# Patient Record
Sex: MALE | Race: WHITE | NOT HISPANIC OR LATINO | Employment: OTHER | RURAL
[De-identification: names, ages, dates, MRNs, and addresses within clinical notes are randomized per-mention and may not be internally consistent; named-entity substitution may affect disease eponyms.]

---

## 2021-01-26 NOTE — PROGRESS NOTES
Chief Complaint  Establish Care    Subjective          Juan Cheng presents to [facility] [place] for     Pt is 66 yo male with management of being overweight  DM Type 2, HTN, HLP, history of genital herpes, CAD sp CABG x 4, history of colonic polyps     1/2621 Pt is here to establish.  He is from [place] and lived in [place] for many years. He is retired and worked for [employer].  He has history of CAD sp CABG X 4. In 2017.  .  His Cardiologist is Dr. Padgett in [place]. Do not have records and sees his Cardilogist about once a year.   Pt has a CMH of being overweight, HTN in which he takes toprol XL 25 mg PO q daily. For DM type 2 pt is on metformin 1000 mg PO q dialy along with jardiance 25 mg PO q daily.  He takes aspirin along with crestor for HLP. For Genital herpes pt is on Valtrex. Pt did see [facility] recently on 1/17/21 at . For low grade fever. He was tested for COVID that was negative. Along with flu test negative. He also has genital lesions that appeared about a few weeks ago. He got tested at [facility] and the test was not run properly. He states the lesions are painful and has been getting better with valtrex and has 8 pills left. He started treatment on 1/17/21. He has not been testes for herpes. He also has some irritation  Along shaft of penis. Also has various nodules and knots along with inguinal area. No pain on urination.  He has had more than 10 sexual partners  curretly  his  for 3 years. He has been  at least 4 times.  He is also having constipation issues since taking valtrex and his last cologuard test was last year which was negative. He has had 2 colonocopies in the past/        Male  Problem  The patient's pertinent negatives include no genital injury, genital lesions, pelvic pain, penile discharge, penile pain, priapism, scrotal swelling or testicular pain. This is a recurrent problem. The current episode started today. The  problem occurs constantly. The problem has been unchanged. The patient is experiencing no pain. Pertinent negatives include no abdominal pain, anorexia, chest pain, chills, constipation, coughing, diarrhea, discolored urine, dysuria, fever, flank pain, frequency, headaches, hematuria, hesitancy, joint pain, joint swelling, nausea, painful intercourse, rash, shortness of breath (), sore throat, urgency, urinary retention or vomiting. Associated symptoms comments: Genital lesion on penis . He has tried nothing for the symptoms. The treatment provided no relief. He is sexually active. No, his partner does not have an STD. There is no history of BPH, chlamydia, cryptorchidism, erectile aid use, erectile dysfunction, a femoral hernia, gonorrhea, herpes simplex, HIV, an inguinal hernia, kidney stones, prostatitis, sickle cell disease or varicocele.   Diabetes  He presents for his initial diabetic visit. He has type 2 diabetes mellitus. His disease course has been stable. Pertinent negatives for hypoglycemia include no confusion, dizziness, headaches, hunger, mood changes, nervousness/anxiousness, pallor, seizures, sleepiness, speech difficulty, sweats or tremors. Pertinent negatives for diabetes include no blurred vision, no chest pain, no fatigue, no foot paresthesias, no foot ulcerations, no polydipsia, no polyphagia, no polyuria, no visual change, no weakness and no weight loss. Pertinent negatives for hypoglycemia complications include no blackouts, no hospitalization, no nocturnal hypoglycemia, no required assistance and no required glucagon injection. Symptoms are stable. Pertinent negatives for diabetic complications include no autonomic neuropathy, CVA, heart disease, impotence, nephropathy, PVD or retinopathy. Risk factors for coronary artery disease include diabetes mellitus, dyslipidemia, hypertension and sedentary lifestyle. Current diabetic treatment includes oral agent (dual therapy). He is compliant with  treatment most of the time. His weight is stable. He is following a generally healthy diet. He has not had a previous visit with a dietitian. He participates in exercise intermittently. An ACE inhibitor/angiotensin II receptor blocker is not being taken. He does not see a podiatrist.Eye exam is not current.   Hypertension  This is a recurrent problem. The current episode started more than 1 month ago. The problem is unchanged. The problem is controlled. Pertinent negatives include no blurred vision, chest pain, headaches, shortness of breath () or sweats. Risk factors for coronary artery disease include diabetes mellitus, dyslipidemia, male gender and sedentary lifestyle. Past treatments include beta blockers. Current antihypertension treatment includes beta blockers. The current treatment provides significant improvement. There are no compliance problems.  There is no history of angina, kidney disease, CAD/MI, CVA, heart failure, left ventricular hypertrophy, PVD or retinopathy. There is no history of chronic renal disease, coarctation of the aorta, hyperaldosteronism, hypercortisolism, hyperparathyroidism, a hypertension causing med, pheochromocytoma, renovascular disease, sleep apnea or a thyroid problem.   Hyperlipidemia  This is a recurrent problem. The current episode started more than 1 month ago. The problem is controlled. Recent lipid tests were reviewed and are variable. He has no history of chronic renal disease, diabetes, hypothyroidism, liver disease, obesity or nephrotic syndrome. Pertinent negatives include no chest pain or shortness of breath (). Current antihyperlipidemic treatment includes statins. The current treatment provides moderate improvement of lipids. Risk factors for coronary artery disease include male sex, dyslipidemia and diabetes mellitus.   Constipation  This is a recurrent problem. The current episode started more than 1 month ago. The problem is unchanged. The patient is not on a  "high fiber diet. He does not exercise regularly. There has not been adequate water intake. Pertinent negatives include no abdominal pain, anorexia, back pain, bloating, diarrhea, difficulty urinating, fecal incontinence, fever, flatus, hematochezia, hemorrhoids, melena, nausea, vomiting or weight loss. The treatment provided mild relief. There is no history of abdominal surgery, endocrine disease, inflammatory bowel disease, irritable bowel syndrome, metabolic disease, neurologic disease, neuromuscular disease or radiation treatment.     Objective   Vital Signs:   /78 (BP Location: Left arm, Patient Position: Sitting, Cuff Size: Large Adult)   Pulse 94   Temp 96.6 °F (35.9 °C)   Ht 172.7 cm (68\")   Wt 86.3 kg (190 lb 3.2 oz)   SpO2 95%   BMI 28.92 kg/m²       Physical Exam  Vitals signs and nursing note reviewed.   Constitutional:       Appearance: He is well-developed. He is not diaphoretic.   HENT:      Head: Normocephalic and atraumatic.      Right Ear: External ear normal.   Eyes:      Conjunctiva/sclera: Conjunctivae normal.      Pupils: Pupils are equal, round, and reactive to light.   Neck:      Musculoskeletal: Normal range of motion and neck supple.   Cardiovascular:      Rate and Rhythm: Normal rate and regular rhythm.      Heart sounds: Normal heart sounds. No murmur.   Pulmonary:      Effort: Pulmonary effort is normal. No respiratory distress.      Breath sounds: Normal breath sounds.   Abdominal:      General: Bowel sounds are normal. There is no distension.      Palpations: Abdomen is soft.      Tenderness: There is no abdominal tenderness.   Genitourinary:      Musculoskeletal: Normal range of motion.         General: No deformity.   Skin:     General: Skin is warm.      Coloration: Skin is not pale.      Findings: No erythema or rash.   Neurological:      Mental Status: He is alert and oriented to person, place, and time.      Cranial Nerves: No cranial nerve deficit.   Psychiatric:    "      Behavior: Behavior normal.        Result Review :                 Assessment and Plan    Problem List Items Addressed This Visit        Cardiac and Vasculature    Coronary artery disease    Relevant Medications    metoprolol succinate XL (TOPROL-XL) 25 MG 24 hr tablet    Mixed hyperlipidemia    Relevant Medications    rosuvastatin (CRESTOR) 10 MG tablet    Other Relevant Orders    CBC Auto Differential    Comprehensive Metabolic Panel    Hemoglobin A1c    Lipid Panel    TSH    T4, Free    Vitamin D 25 Hydroxy    Vitamin B12    Microalbumin / Creatinine Urine Ratio - Urine, Clean Catch       Endocrine and Metabolic    Vitamin D deficiency, unspecified     Relevant Orders    Vitamin D 25 Hydroxy    Controlled type 2 diabetes mellitus with complication, without long-term current use of insulin (CMS/[facility])    Relevant Medications    metFORMIN (GLUCOPHAGE) 1000 MG tablet    Jardiance 25 MG tablet    Other Relevant Orders    CBC Auto Differential    Comprehensive Metabolic Panel    Hemoglobin A1c    Lipid Panel    TSH    T4, Free    Vitamin D 25 Hydroxy    Vitamin B12    Microalbumin / Creatinine Urine Ratio - Urine, Clean Catch    Overweight    Relevant Orders    CBC Auto Differential    Comprehensive Metabolic Panel    Hemoglobin A1c    Lipid Panel    TSH    T4, Free    Vitamin D 25 Hydroxy    Vitamin B12    Microalbumin / Creatinine Urine Ratio - Urine, Clean Catch       Infectious Diseases    Yeast infection    Relevant Medications    fluconazole (Diflucan) 150 MG tablet      Other Visit Diagnoses     Screening for STDs (sexually transmitted diseases)    -  Primary    Relevant Orders    HIV-1/O/2 ANTIGEN/ANTIBODY, 4TH GENERATION    HSV 1 and 2 IgM, Abs, Indirect    HSV 1 and 2-Specific Ab, IgG    RPR    Wound Culture - Wound, Penis    HIV-1 & HIV-2 Antibodies    Encounter for screening for endocrine disorder        Relevant Orders    CBC Auto Differential    Comprehensive Metabolic Panel    Hemoglobin A1c    Lipid  Panel    TSH    T4, Free    Vitamin D 25 Hydroxy    Vitamin B12    Microalbumin / Creatinine Urine Ratio - Urine, Clean Catch    HIV-1/O/2 ANTIGEN/ANTIBODY, 4TH GENERATION    HSV 1 and 2 IgM, Abs, Indirect    HSV 1 and 2-Specific Ab, IgG    RPR    Wound Culture - Wound, Penis    General medical examination        Relevant Orders    CBC Auto Differential    Comprehensive Metabolic Panel    Hemoglobin A1c    Lipid Panel    TSH    T4, Free    Vitamin D 25 Hydroxy    Vitamin B12    Microalbumin / Creatinine Urine Ratio - Urine, Clean Catch    Annual physical exam        Relevant Orders    CBC Auto Differential    Comprehensive Metabolic Panel    Hemoglobin A1c    Lipid Panel    TSH    T4, Free    Vitamin D 25 Hydroxy    Vitamin B12    Microalbumin / Creatinine Urine Ratio - Urine, Clean Catch    Need for hepatitis C screening test        Relevant Orders    CBC Auto Differential    Comprehensive Metabolic Panel    Hemoglobin A1c    Lipid Panel    TSH    T4, Free    Vitamin D 25 Hydroxy    Vitamin B12    Microalbumin / Creatinine Urine Ratio - Urine, Clean Catch    Hepatitis C Antibody    Dysuria        Relevant Orders    Urinalysis With Microscopic - Urine, Clean Catch    Urine Culture - Urine, Urine, Clean Catch    Genital lesion, male        Relevant Orders    HIV-1/O/2 ANTIGEN/ANTIBODY, 4TH GENERATION    HSV 1 and 2 IgM, Abs, Indirect    HSV 1 and 2-Specific Ab, IgG    RPR    Wound Culture - Wound, Penis    Encounter for screening for HIV        Relevant Orders    HIV-1/O/2 ANTIGEN/ANTIBODY, 4TH GENERATION    HSV 1 and 2 IgM, Abs, Indirect    HSV 1 and 2-Specific Ab, IgG    RPR    Wound Culture - Wound, Penis    S/P CABG x 4            -recommend labwork  -annual physical exam today  -recommend colonoscopy screening    -recommend shingles/tdap/pneumonia vaccination  -hep C screening -hep C antibody test  -constipation - try miralax 17 mg daily.  High fiber diet   -genital lesions/ulcer - wound culture today. STD  screening  Continue valtrex for genital herpes. Recheck in 2 weeks. Also will do STD screening  -CAD sp CABG  X 4 will get Cardiology records from   -HTN - on toprol XL 25 mg PO q daily.   -overweight - counseled weight loss >5 minutes BMI at 28.92  -DM type 2 on metformin 1000 mg PO q daily on jardiance 25 mg PO q daily  -history of genital herpes -on valtrex  -HLP - on crestor 10 mg pO qhs   -advised pt to be safe and call with questions and concerns  -advised pt to go to ER or call 911 if symptoms worrisome or severe  -advised pt to followup with specialist and referrals  -advised pt to be safe during COVID-19 pandemic  -total time with pt >25 minutes  -recheck in 2 weeks         This document has been electronically signed by Gage Means MD on January 27, 2021 11:13 CST          Follow Up   Return in about 1 month (around 2/27/2021).  Patient was given instructions and counseling regarding his condition or for health maintenance advice. Please see specific information pulled into the AVS if appropriate.

## 2021-01-27 ENCOUNTER — OFFICE VISIT (OUTPATIENT)
Dept: FAMILY MEDICINE CLINIC | Facility: CLINIC | Age: 66
End: 2021-01-27

## 2021-01-27 VITALS
HEART RATE: 94 BPM | HEIGHT: 68 IN | OXYGEN SATURATION: 95 % | WEIGHT: 190.2 LBS | DIASTOLIC BLOOD PRESSURE: 78 MMHG | SYSTOLIC BLOOD PRESSURE: 110 MMHG | BODY MASS INDEX: 28.82 KG/M2 | TEMPERATURE: 96.6 F

## 2021-01-27 DIAGNOSIS — Z11.3 SCREENING FOR STDS (SEXUALLY TRANSMITTED DISEASES): Primary | ICD-10-CM

## 2021-01-27 DIAGNOSIS — E11.8 CONTROLLED TYPE 2 DIABETES MELLITUS WITH COMPLICATION, WITHOUT LONG-TERM CURRENT USE OF INSULIN (HCC): ICD-10-CM

## 2021-01-27 DIAGNOSIS — N50.89 GENITAL LESION, MALE: ICD-10-CM

## 2021-01-27 DIAGNOSIS — I25.10 CORONARY ARTERY DISEASE, ANGINA PRESENCE UNSPECIFIED, UNSPECIFIED VESSEL OR LESION TYPE, UNSPECIFIED WHETHER NATIVE OR TRANSPLANTED HEART: ICD-10-CM

## 2021-01-27 DIAGNOSIS — E66.3 OVERWEIGHT: ICD-10-CM

## 2021-01-27 DIAGNOSIS — Z11.59 NEED FOR HEPATITIS C SCREENING TEST: ICD-10-CM

## 2021-01-27 DIAGNOSIS — B37.9 YEAST INFECTION: ICD-10-CM

## 2021-01-27 DIAGNOSIS — Z00.00 GENERAL MEDICAL EXAMINATION: ICD-10-CM

## 2021-01-27 DIAGNOSIS — Z11.4 ENCOUNTER FOR SCREENING FOR HIV: ICD-10-CM

## 2021-01-27 DIAGNOSIS — E78.2 MIXED HYPERLIPIDEMIA: ICD-10-CM

## 2021-01-27 DIAGNOSIS — Z00.00 ANNUAL PHYSICAL EXAM: ICD-10-CM

## 2021-01-27 DIAGNOSIS — R30.0 DYSURIA: ICD-10-CM

## 2021-01-27 DIAGNOSIS — E55.9 VITAMIN D DEFICIENCY, UNSPECIFIED: ICD-10-CM

## 2021-01-27 DIAGNOSIS — Z95.1 S/P CABG X 4: ICD-10-CM

## 2021-01-27 DIAGNOSIS — Z13.29 ENCOUNTER FOR SCREENING FOR ENDOCRINE DISORDER: ICD-10-CM

## 2021-01-27 PROCEDURE — 87147 CULTURE TYPE IMMUNOLOGIC: CPT | Performed by: FAMILY MEDICINE

## 2021-01-27 PROCEDURE — 99204 OFFICE O/P NEW MOD 45 MIN: CPT | Performed by: FAMILY MEDICINE

## 2021-01-27 PROCEDURE — 87205 SMEAR GRAM STAIN: CPT | Performed by: FAMILY MEDICINE

## 2021-01-27 PROCEDURE — 87070 CULTURE OTHR SPECIMN AEROBIC: CPT | Performed by: FAMILY MEDICINE

## 2021-01-27 RX ORDER — FLUCONAZOLE 150 MG/1
150 TABLET ORAL ONCE
Qty: 1 TABLET | Refills: 0 | Status: SHIPPED | OUTPATIENT
Start: 2021-01-27 | End: 2021-01-28 | Stop reason: SDUPTHER

## 2021-01-27 RX ORDER — EMPAGLIFLOZIN 25 MG/1
TABLET, FILM COATED ORAL
COMMUNITY
Start: 2021-01-01 | End: 2021-01-27 | Stop reason: SDUPTHER

## 2021-01-27 RX ORDER — EMPAGLIFLOZIN 25 MG/1
25 TABLET, FILM COATED ORAL DAILY
Qty: 90 TABLET | Refills: 3 | Status: SHIPPED | OUTPATIENT
Start: 2021-01-27 | End: 2021-03-16

## 2021-01-27 RX ORDER — METOPROLOL SUCCINATE 25 MG/1
25 TABLET, EXTENDED RELEASE ORAL DAILY
COMMUNITY
Start: 2020-12-05 | End: 2021-09-09 | Stop reason: SDUPTHER

## 2021-01-27 RX ORDER — CHLORAL HYDRATE 500 MG
CAPSULE ORAL
COMMUNITY
End: 2021-04-01

## 2021-01-27 RX ORDER — POLYETHYLENE GLYCOL 3350 17 G/17G
17 POWDER, FOR SOLUTION ORAL DAILY
Qty: 30 PACKET | Refills: 3 | Status: SHIPPED | OUTPATIENT
Start: 2021-01-27 | End: 2021-03-05

## 2021-01-27 RX ORDER — ASPIRIN 81 MG/1
81 TABLET ORAL DAILY
COMMUNITY

## 2021-01-27 RX ORDER — VALACYCLOVIR HYDROCHLORIDE 1 G/1
TABLET, FILM COATED ORAL
COMMUNITY
Start: 2021-01-21 | End: 2021-03-05

## 2021-01-27 RX ORDER — ROSUVASTATIN CALCIUM 10 MG/1
10 TABLET, COATED ORAL NIGHTLY
COMMUNITY
Start: 2021-01-01 | End: 2021-10-06 | Stop reason: SDUPTHER

## 2021-01-27 NOTE — PATIENT INSTRUCTIONS
Polyethylene Glycol powder  What is this medicine?  POLYETHYLENE GLYCOL 3350 (tiffany ee ETH i ciro; GLYE col) powder is a laxative used to treat constipation. It increases the amount of water in the stool. Bowel movements become easier and more frequent.  This medicine may be used for other purposes; ask your health care provider or pharmacist if you have questions.  COMMON BRAND NAME(S): GaviLax, GIALAX, GlycoLax, Healthylax, MiraLax, Smooth LAX, [employer] Health  What should I tell my health care provider before I take this medicine?  They need to know if you have any of these conditions:  · a history of blockage of the stomach or intestine  · current abdomen distension or pain  · difficulty swallowing  · diverticulitis, ulcerative colitis, or other chronic bowel disease  · phenylketonuria  · an unusual or allergic reaction to polyethylene glycol, other medicines, dyes, or preservatives  · pregnant or trying to get pregnant  · breast-feeding  How should I use this medicine?  Take this medicine by mouth. The bottle has a measuring cap that is marked with a line. Pour the powder into the cap up to the marked line (the dose is about 1 heaping tablespoon). Add the powder in the cap to a full glass (4 to 8 ounces or 120 to 240 mL) of water, juice, soda, coffee or tea. Mix the powder well. Ensure that the powder is fully dissolved. Do not drink if there are any clumps. Drink the solution. Take exactly as directed. Do not take your medicine more often than directed.  Talk to your pediatrician regarding the use of this medicine in children. Special care may be needed.  Overdosage: If you think you have taken too much of this medicine contact a poison control center or emergency room at once.  NOTE: This medicine is only for you. Do not share this medicine with others.  What if I miss a dose?  If you miss a dose, take it as soon as you can. If it is almost time for your next dose, take only that dose. Do not take double or extra  doses.  What may interact with this medicine?  Interactions are not expected.  This list may not describe all possible interactions. Give your health care provider a list of all the medicines, herbs, non-prescription drugs, or dietary supplements you use. Also tell them if you smoke, drink alcohol, or use illegal drugs. Some items may interact with your medicine.  What should I watch for while using this medicine?  Do not use for more than 2 weeks without advice from your doctor or health care professional. It can take 2 to 4 days to have a bowel movement and to experience improvement in constipation. See your health care professional for any changes in bowel habits, including constipation, that are severe or last longer than three weeks.  Always take this medicine with plenty of water.  What side effects may I notice from receiving this medicine?  Side effects that you should report to your doctor or health care professional as soon as possible:  · diarrhea  · difficulty breathing  · itching of the skin, hives, or skin rash  · severe bloating, pain, or distension of the stomach  · vomiting  Side effects that usually do not require medical attention (report to your doctor or health care professional if they continue or are bothersome):  · bloating or gas  · lower abdominal discomfort or cramps  · nausea  This list may not describe all possible side effects. Call your doctor for medical advice about side effects. You may report side effects to FDA at 9-364-EFT-1773.  Where should I keep my medicine?  Keep out of the reach of children.  Store between 15 and 30 degrees C (59 and 86 degrees F). Throw away any unused medicine after the expiration date.  NOTE: This sheet is a summary. It may not cover all possible information. If you have questions about this medicine, talk to your doctor, pharmacist, or health care provider.  © 2020 Elsevier/Gold Standard (2019-06-06 10:42:01)    High-Fiber Diet  Fiber, also called  dietary fiber, is a type of carbohydrate that is found in fruits, vegetables, whole grains, and beans. A high-fiber diet can have many health benefits. Your health care provider may recommend a high-fiber diet to help:  · Prevent constipation. Fiber can make your bowel movements more regular.  · Lower your cholesterol.  · Relieve the following conditions:  ? Swelling of veins in the anus (hemorrhoids).  ? Swelling and irritation (inflammation) of specific areas of the digestive tract (uncomplicated diverticulosis).  ? A problem of the large intestine (colon) that sometimes causes pain and diarrhea (irritable bowel syndrome, IBS).  · Prevent overeating as part of a weight-loss plan.  · Prevent heart disease, type 2 diabetes, and certain cancers.  What is my plan?  The recommended daily fiber intake in grams (g) includes:  · 38 g for men age 50 or younger.  · 30 g for men over age 50.  · 25 g for women age 50 or younger.  · 21 g for women over age 50.  You can get the recommended daily intake of dietary fiber by:  · Eating a variety of fruits, vegetables, grains, and beans.  · Taking a fiber supplement, if it is not possible to get enough fiber through your diet.  What do I need to know about a high-fiber diet?  · It is better to get fiber through food sources rather than from fiber supplements. There is not a lot of research about how effective supplements are.  · Always check the fiber content on the nutrition facts label of any prepackaged food. Look for foods that contain 5 g of fiber or more per serving.  · Talk with a diet and nutrition specialist (dietitian) if you have questions about specific foods that are recommended or not recommended for your medical condition, especially if those foods are not listed below.  · Gradually increase how much fiber you consume. If you increase your intake of dietary fiber too quickly, you may have bloating, cramping, or gas.  · Drink plenty of water. Water helps you to digest  fiber.  What are tips for following this plan?  · Eat a wide variety of high-fiber foods.  · Make sure that half of the grains that you eat each day are whole grains.  · Eat breads and cereals that are made with whole-grain flour instead of refined flour or white flour.  · Eat brown rice, bulgur wheat, or millet instead of white rice.  · Start the day with a breakfast that is high in fiber, such as a cereal that contains 5 g of fiber or more per serving.  · Use beans in place of meat in soups, salads, and pasta dishes.  · Eat high-fiber snacks, such as berries, raw vegetables, nuts, and popcorn.  · Choose whole fruits and vegetables instead of processed forms like juice or sauce.  What foods can I eat?    Fruits  Berries. Pears. Apples. Oranges. Avocado. Prunes and raisins. Dried figs.  Vegetables  Sweet potatoes. Spinach. Kale. Artichokes. Cabbage. Broccoli. Cauliflower. Green peas. Carrots. Squash.  Grains  Whole-grain breads. Multigrain cereal. Oats and oatmeal. Brown rice. Barley. Bulgur wheat. Millet. Quinoa. Bran muffins. Popcorn. Rye wafer crackers.  Meats and other proteins  Navy, kidney, and gunter beans. Soybeans. Split peas. Lentils. Nuts and seeds.  Dairy  Fiber-fortified yogurt.  Beverages  Fiber-fortified soy milk. Fiber-fortified orange juice.  Other foods  Fiber bars.  The items listed above may not be a complete list of recommended foods and beverages. Contact a dietitian for more options.  What foods are not recommended?  Fruits  Fruit juice. Cooked, strained fruit.  Vegetables  Fried potatoes. Canned vegetables. Well-cooked vegetables.  Grains  White bread. Pasta made with refined flour. White rice.  Meats and other proteins  Fatty cuts of meat. Fried chicken or fried fish.  Dairy  Milk. Yogurt. Cream cheese. Sour cream.  Fats and oils  [place].  Beverages  Soft drinks.  Other foods  Cakes and pastries.  The items listed above may not be a complete list of foods and beverages to avoid. Contact a  dietitian for more information.  Summary  · Fiber is a type of carbohydrate. It is found in fruits, vegetables, whole grains, and beans.  · There are many health benefits of eating a high-fiber diet, such as preventing constipation, lowering blood cholesterol, helping with weight loss, and reducing your risk of heart disease, diabetes, and certain cancers.  · Gradually increase your intake of fiber. Increasing too fast can result in cramping, bloating, and gas. Drink plenty of water while you increase your fiber.  · The best sources of fiber include whole fruits and vegetables, whole grains, nuts, seeds, and beans.  This information is not intended to replace advice given to you by your health care provider. Make sure you discuss any questions you have with your health care provider.  Document Revised: 10/22/2018 Document Reviewed: 10/22/2018  Elsevier Patient Education © 2020 Elsevier Inc.

## 2021-01-28 ENCOUNTER — TELEPHONE (OUTPATIENT)
Dept: FAMILY MEDICINE CLINIC | Facility: CLINIC | Age: 66
End: 2021-01-28

## 2021-01-28 ENCOUNTER — LAB (OUTPATIENT)
Dept: LAB | Facility: HOSPITAL | Age: 66
End: 2021-01-28

## 2021-01-28 DIAGNOSIS — N50.89 GENITAL LESION, MALE: ICD-10-CM

## 2021-01-28 DIAGNOSIS — E66.3 OVERWEIGHT: ICD-10-CM

## 2021-01-28 DIAGNOSIS — Z11.59 NEED FOR HEPATITIS C SCREENING TEST: ICD-10-CM

## 2021-01-28 DIAGNOSIS — R30.0 DYSURIA: ICD-10-CM

## 2021-01-28 DIAGNOSIS — Z00.00 GENERAL MEDICAL EXAMINATION: ICD-10-CM

## 2021-01-28 DIAGNOSIS — E55.9 VITAMIN D DEFICIENCY, UNSPECIFIED: ICD-10-CM

## 2021-01-28 DIAGNOSIS — Z00.00 ANNUAL PHYSICAL EXAM: ICD-10-CM

## 2021-01-28 DIAGNOSIS — E78.2 MIXED HYPERLIPIDEMIA: ICD-10-CM

## 2021-01-28 DIAGNOSIS — Z13.29 ENCOUNTER FOR SCREENING FOR ENDOCRINE DISORDER: ICD-10-CM

## 2021-01-28 DIAGNOSIS — Z11.3 SCREENING FOR STDS (SEXUALLY TRANSMITTED DISEASES): ICD-10-CM

## 2021-01-28 DIAGNOSIS — E11.8 CONTROLLED TYPE 2 DIABETES MELLITUS WITH COMPLICATION, WITHOUT LONG-TERM CURRENT USE OF INSULIN (HCC): ICD-10-CM

## 2021-01-28 DIAGNOSIS — Z11.4 ENCOUNTER FOR SCREENING FOR HIV: ICD-10-CM

## 2021-01-28 LAB
25(OH)D3 SERPL-MCNC: 22.5 NG/ML (ref 30–100)
ALBUMIN SERPL-MCNC: 4.6 G/DL (ref 3.5–5.2)
ALBUMIN UR-MCNC: <1.2 MG/DL
ALBUMIN/GLOB SERPL: 1.4 G/DL
ALP SERPL-CCNC: 79 U/L (ref 39–117)
ALT SERPL W P-5'-P-CCNC: 51 U/L (ref 1–41)
ANION GAP SERPL CALCULATED.3IONS-SCNC: 9.3 MMOL/L (ref 5–15)
AST SERPL-CCNC: 27 U/L (ref 1–40)
BACTERIA UR QL AUTO: NORMAL /HPF
BASOPHILS # BLD AUTO: 0.12 10*3/MM3 (ref 0–0.2)
BASOPHILS NFR BLD AUTO: 1.2 % (ref 0–1.5)
BILIRUB SERPL-MCNC: 0.4 MG/DL (ref 0–1.2)
BILIRUB UR QL STRIP: NEGATIVE
BUN SERPL-MCNC: 23 MG/DL (ref 8–23)
BUN/CREAT SERPL: 29.1 (ref 7–25)
CALCIUM SPEC-SCNC: 10.2 MG/DL (ref 8.6–10.5)
CHLORIDE SERPL-SCNC: 100 MMOL/L (ref 98–107)
CHOLEST SERPL-MCNC: 106 MG/DL (ref 0–200)
CLARITY UR: CLEAR
CO2 SERPL-SCNC: 27.7 MMOL/L (ref 22–29)
COLOR UR: YELLOW
CREAT SERPL-MCNC: 0.79 MG/DL (ref 0.76–1.27)
CREAT UR-MCNC: 71.6 MG/DL
DEPRECATED RDW RBC AUTO: 40 FL (ref 37–54)
EOSINOPHIL # BLD AUTO: 0.19 10*3/MM3 (ref 0–0.4)
EOSINOPHIL NFR BLD AUTO: 1.9 % (ref 0.3–6.2)
ERYTHROCYTE [DISTWIDTH] IN BLOOD BY AUTOMATED COUNT: 12.9 % (ref 12.3–15.4)
GFR SERPL CREATININE-BSD FRML MDRD: 98 ML/MIN/1.73
GLOBULIN UR ELPH-MCNC: 3.4 GM/DL
GLUCOSE SERPL-MCNC: 119 MG/DL (ref 65–99)
GLUCOSE UR STRIP-MCNC: ABNORMAL MG/DL
HBA1C MFR BLD: 6.6 % (ref 4.8–5.6)
HCT VFR BLD AUTO: 47.7 % (ref 37.5–51)
HCV AB SER DONR QL: NORMAL
HDLC SERPL-MCNC: 35 MG/DL (ref 40–60)
HGB BLD-MCNC: 16.4 G/DL (ref 13–17.7)
HGB UR QL STRIP.AUTO: NEGATIVE
HIV1+2 AB SER QL: NORMAL
HYALINE CASTS UR QL AUTO: NORMAL /LPF
IMM GRANULOCYTES # BLD AUTO: 0.04 10*3/MM3 (ref 0–0.05)
IMM GRANULOCYTES NFR BLD AUTO: 0.4 % (ref 0–0.5)
KETONES UR QL STRIP: ABNORMAL
LDLC SERPL CALC-MCNC: 40 MG/DL (ref 0–100)
LDLC/HDLC SERPL: 0.92 {RATIO}
LEUKOCYTE ESTERASE UR QL STRIP.AUTO: NEGATIVE
LYMPHOCYTES # BLD AUTO: 4.71 10*3/MM3 (ref 0.7–3.1)
LYMPHOCYTES NFR BLD AUTO: 46.1 % (ref 19.6–45.3)
MCH RBC QN AUTO: 30.2 PG (ref 26.6–33)
MCHC RBC AUTO-ENTMCNC: 34.4 G/DL (ref 31.5–35.7)
MCV RBC AUTO: 87.8 FL (ref 79–97)
MICROALBUMIN/CREAT UR: NORMAL MG/G{CREAT}
MONOCYTES # BLD AUTO: 0.6 10*3/MM3 (ref 0.1–0.9)
MONOCYTES NFR BLD AUTO: 5.9 % (ref 5–12)
NEUTROPHILS NFR BLD AUTO: 4.55 10*3/MM3 (ref 1.7–7)
NEUTROPHILS NFR BLD AUTO: 44.5 % (ref 42.7–76)
NITRITE UR QL STRIP: NEGATIVE
NRBC BLD AUTO-RTO: 0 /100 WBC (ref 0–0.2)
PH UR STRIP.AUTO: 6 [PH] (ref 5–8)
PLATELET # BLD AUTO: 276 10*3/MM3 (ref 140–450)
PMV BLD AUTO: 10.1 FL (ref 6–12)
POTASSIUM SERPL-SCNC: 4.6 MMOL/L (ref 3.5–5.2)
PROT SERPL-MCNC: 8 G/DL (ref 6–8.5)
PROT UR QL STRIP: NEGATIVE
RBC # BLD AUTO: 5.43 10*6/MM3 (ref 4.14–5.8)
RBC # UR: NORMAL /HPF
REF LAB TEST METHOD: NORMAL
RPR SER QL: NORMAL
SODIUM SERPL-SCNC: 137 MMOL/L (ref 136–145)
SP GR UR STRIP: >=1.03 (ref 1–1.03)
SQUAMOUS #/AREA URNS HPF: NORMAL /HPF
T4 FREE SERPL-MCNC: 1.35 NG/DL (ref 0.93–1.7)
TRIGL SERPL-MCNC: 194 MG/DL (ref 0–150)
TSH SERPL DL<=0.05 MIU/L-ACNC: 1.2 UIU/ML (ref 0.27–4.2)
UROBILINOGEN UR QL STRIP: ABNORMAL
VIT B12 BLD-MCNC: 281 PG/ML (ref 211–946)
VLDLC SERPL-MCNC: 31 MG/DL (ref 5–40)
WBC # BLD AUTO: 10.21 10*3/MM3 (ref 3.4–10.8)
WBC UR QL AUTO: NORMAL /HPF

## 2021-01-28 PROCEDURE — 86592 SYPHILIS TEST NON-TREP QUAL: CPT

## 2021-01-28 PROCEDURE — 87086 URINE CULTURE/COLONY COUNT: CPT

## 2021-01-28 PROCEDURE — 86696 HERPES SIMPLEX TYPE 2 TEST: CPT

## 2021-01-28 PROCEDURE — G0432 EIA HIV-1/HIV-2 SCREEN: HCPCS

## 2021-01-28 PROCEDURE — 81001 URINALYSIS AUTO W/SCOPE: CPT

## 2021-01-28 PROCEDURE — 84443 ASSAY THYROID STIM HORMONE: CPT

## 2021-01-28 PROCEDURE — 86803 HEPATITIS C AB TEST: CPT

## 2021-01-28 PROCEDURE — 82043 UR ALBUMIN QUANTITATIVE: CPT

## 2021-01-28 PROCEDURE — 82570 ASSAY OF URINE CREATININE: CPT

## 2021-01-28 PROCEDURE — 86695 HERPES SIMPLEX TYPE 1 TEST: CPT

## 2021-01-28 PROCEDURE — 84439 ASSAY OF FREE THYROXINE: CPT

## 2021-01-28 PROCEDURE — 82306 VITAMIN D 25 HYDROXY: CPT

## 2021-01-28 PROCEDURE — 82607 VITAMIN B-12: CPT

## 2021-01-28 PROCEDURE — 80061 LIPID PANEL: CPT

## 2021-01-28 PROCEDURE — 83036 HEMOGLOBIN GLYCOSYLATED A1C: CPT

## 2021-01-28 PROCEDURE — 85025 COMPLETE CBC W/AUTO DIFF WBC: CPT

## 2021-01-28 PROCEDURE — 80053 COMPREHEN METABOLIC PANEL: CPT

## 2021-01-28 RX ORDER — SULFAMETHOXAZOLE AND TRIMETHOPRIM 800; 160 MG/1; MG/1
1 TABLET ORAL 2 TIMES DAILY
Qty: 20 TABLET | Refills: 0 | Status: SHIPPED | OUTPATIENT
Start: 2021-01-28 | End: 2021-02-07

## 2021-01-28 RX ORDER — FLUCONAZOLE 150 MG/1
TABLET ORAL
Qty: 2 TABLET | Refills: 0 | Status: SHIPPED | OUTPATIENT
Start: 2021-01-28 | End: 2021-03-05

## 2021-01-28 NOTE — TELEPHONE ENCOUNTER
DELETE AFTER REVIEWING: Telephone encounter to be sent to the clinical pool.    Pharmacy Name:  [facility]    Pharmacy representative name: BRAYAN    Pharmacy representative phone number: [phone]    What medication are you calling in regards to: SLUCONAZOLE    What question does the pharmacy have: INCORRECT QUANTITY  Who is the provider that prescribed the medication:

## 2021-01-28 NOTE — TELEPHONE ENCOUNTER
PATIENT WIFE CALLED SHE WOULD LIKE SOMEONE TO CALL HER TO DISCUSS HER HUSBANDS LAB RESULTS THAT WERE DONE THIS MORNING AND TO SEE WHAT AND WHY HE WAS PRESCRIBED.    PLEASE CALL AND ADVISE  [phone]

## 2021-01-28 NOTE — TELEPHONE ENCOUNTER
Made aware that we did not send this medication in and she voiced understanding. She stated [facility] probably sent it from when he went to urgent care.

## 2021-01-28 NOTE — TELEPHONE ENCOUNTER
----- Message from Gage Means MD sent at 1/27/2021  8:33 PM CST -----  Please call pt    Pt's  Wound shows gram positive cocci in pairs. May be staph infection or MRSA    Recommend pt start on Bactrim -160 mg every 12 hours for 10 days. Give 20 pills and no refills.      Continue with valtrex and diflucan    Awaiting rest of wound culture    Recheck on next visit. Thanks

## 2021-01-28 NOTE — TELEPHONE ENCOUNTER
PT'S WIFE CALLED REGARDING PT'S MEDICATIONS; PT WAS ADVISED BY PHARMACIST TO CLARIFY WITH DR. AVINA WHETHER HE SHOULD BE TAKING BOTH VALTREX AND THE NEW PRESCRIPTION OF VALACYLOVIR CALLED IN TODAY SINCE THEY ARE ESSENTIALLY THE SAME MEDICATION    PT STILL HAS 6-7 DOSES OF VALTREX LEFT AND WANTS TO KNOW IF HE SHOULD STOP TAKING IT    CALLBACK [phone]

## 2021-01-29 LAB
BACTERIA SPEC AEROBE CULT: ABNORMAL
BACTERIA SPEC AEROBE CULT: ABNORMAL
BACTERIA SPEC AEROBE CULT: NO GROWTH
GRAM STN SPEC: ABNORMAL
GRAM STN SPEC: ABNORMAL
HSV1 IGG SER IA-ACNC: 1.57 INDEX (ref 0–0.9)
HSV2 IGG SER IA-ACNC: 2.03 INDEX (ref 0–0.9)
HSV2 IGG SERPL QL IA: POSITIVE

## 2021-02-01 ENCOUNTER — TELEPHONE (OUTPATIENT)
Dept: FAMILY MEDICINE CLINIC | Facility: CLINIC | Age: 66
End: 2021-02-01

## 2021-02-01 DIAGNOSIS — R74.8 ELEVATED LIVER ENZYMES: Primary | ICD-10-CM

## 2021-02-01 LAB
HSV1 IGM TITR SER IF: ABNORMAL TITER
HSV2 IGM TITR SER IF: ABNORMAL TITER

## 2021-02-01 RX ORDER — ERGOCALCIFEROL 1.25 MG/1
50000 CAPSULE ORAL
Qty: 4 CAPSULE | Refills: 3 | Status: SHIPPED | OUTPATIENT
Start: 2021-02-01

## 2021-02-01 NOTE — TELEPHONE ENCOUNTER
----- Message from Gage Means MD sent at 1/30/2021  7:03 PM CST -----  Let pt know wound shows group B strep and continue with abx until finished    Recheck on next visit Thanks

## 2021-02-01 NOTE — TELEPHONE ENCOUNTER
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 Let me know if pt agreeable and I will order    Recheck on next visit. Thanks

## 2021-02-01 NOTE — TELEPHONE ENCOUNTER
Gave pt results and recommendations. Pt voiced understanding and is agreeable to US and Vitamin D.

## 2021-02-12 ENCOUNTER — TELEPHONE (OUTPATIENT)
Dept: FAMILY MEDICINE CLINIC | Facility: CLINIC | Age: 66
End: 2021-02-12

## 2021-02-12 NOTE — TELEPHONE ENCOUNTER
----- Message from Gage Means MD sent at 2/12/2021  2:33 PM CST -----  Regarding: US of liver  Please let pt know that US of liver  on 2/10/21 shows areas that suggest mild fatty liver. Will need to discuss on next visit. Thanks

## 2021-02-24 DIAGNOSIS — R74.8 ELEVATED LIVER ENZYMES: ICD-10-CM

## 2021-03-05 ENCOUNTER — OFFICE VISIT (OUTPATIENT)
Dept: FAMILY MEDICINE CLINIC | Facility: CLINIC | Age: 66
End: 2021-03-05

## 2021-03-05 VITALS
HEART RATE: 88 BPM | WEIGHT: 193 LBS | DIASTOLIC BLOOD PRESSURE: 68 MMHG | BODY MASS INDEX: 29.35 KG/M2 | OXYGEN SATURATION: 96 % | SYSTOLIC BLOOD PRESSURE: 110 MMHG | TEMPERATURE: 96.4 F

## 2021-03-05 DIAGNOSIS — E66.3 OVERWEIGHT: ICD-10-CM

## 2021-03-05 DIAGNOSIS — E55.9 VITAMIN D DEFICIENCY, UNSPECIFIED: ICD-10-CM

## 2021-03-05 DIAGNOSIS — I25.10 CORONARY ARTERY DISEASE, ANGINA PRESENCE UNSPECIFIED, UNSPECIFIED VESSEL OR LESION TYPE, UNSPECIFIED WHETHER NATIVE OR TRANSPLANTED HEART: ICD-10-CM

## 2021-03-05 DIAGNOSIS — Z23 NEED FOR VACCINATION: ICD-10-CM

## 2021-03-05 DIAGNOSIS — Z12.11 ENCOUNTER FOR SCREENING COLONOSCOPY: ICD-10-CM

## 2021-03-05 DIAGNOSIS — E78.2 MIXED HYPERLIPIDEMIA: ICD-10-CM

## 2021-03-05 DIAGNOSIS — E11.8 CONTROLLED TYPE 2 DIABETES MELLITUS WITH COMPLICATION, WITHOUT LONG-TERM CURRENT USE OF INSULIN (HCC): Primary | ICD-10-CM

## 2021-03-05 DIAGNOSIS — B00.9 HERPES INFECTION: ICD-10-CM

## 2021-03-05 PROCEDURE — 90471 IMMUNIZATION ADMIN: CPT | Performed by: FAMILY MEDICINE

## 2021-03-05 PROCEDURE — 90715 TDAP VACCINE 7 YRS/> IM: CPT | Performed by: FAMILY MEDICINE

## 2021-03-05 PROCEDURE — 90732 PPSV23 VACC 2 YRS+ SUBQ/IM: CPT | Performed by: FAMILY MEDICINE

## 2021-03-05 PROCEDURE — 99214 OFFICE O/P EST MOD 30 MIN: CPT | Performed by: FAMILY MEDICINE

## 2021-03-05 PROCEDURE — G0009 ADMIN PNEUMOCOCCAL VACCINE: HCPCS | Performed by: FAMILY MEDICINE

## 2021-03-05 RX ORDER — CHOLECALCIFEROL (VITAMIN D3) 125 MCG
500 CAPSULE ORAL DAILY
Qty: 30 TABLET | Refills: 3 | Status: SHIPPED | OUTPATIENT
Start: 2021-03-05 | End: 2021-10-14 | Stop reason: SDUPTHER

## 2021-03-05 RX ORDER — ICOSAPENT ETHYL 1000 MG/1
2 CAPSULE ORAL 2 TIMES DAILY WITH MEALS
Qty: 120 CAPSULE | Refills: 3 | Status: SHIPPED | OUTPATIENT
Start: 2021-03-05 | End: 2021-05-11

## 2021-03-05 NOTE — PATIENT INSTRUCTIONS
Icosapent ethyl capsules  What is this medicine?  ICOSAPENT ETHYL (eye KOE sa [facility]) contains essential fats. It is used to treat high triglyceride levels. Diet and lifestyle changes are often used with this drug.  This medicine may be used for other purposes; ask your health care provider or pharmacist if you have questions.  COMMON BRAND NAME(S): VASCEPA  What should I tell my health care provider before I take this medicine?  They need to know if you have any of these conditions:  · bleeding disorders  · liver disease  · an unusual or allergic reaction to icosapent ethyl, fish, shellfish, other medicines, foods, dyes, or preservatives  · history of irregular heartbeat  · pregnant or trying to get pregnant  · breast-feeding  How should I use this medicine?  Take this medicine by mouth with a glass of water. Follow the directions on the prescription label. Take this medicine with food. Do not cut, crush, dissolve, or chew this medicine. Take your medicine at regular intervals. Do not take it more often than directed. Do not stop taking except on your doctor's advice.  Talk to your pediatrician regarding the use of this medicine in children. Special care may be needed.  Overdosage: If you think you have taken too much of this medicine contact a poison control center or emergency room at once.  NOTE: This medicine is only for you. Do not share this medicine with others.  What if I miss a dose?  If you miss a dose, take it as soon as you can. If it is almost time for your next dose, take only that dose. Do not take double or extra doses.  What may interact with this medicine?  This medicine may interact with the following medications:  · aspirin and aspirin-like medicines  · beta-blockers like metoprolol and propranolol  · certain medicines that treat or prevent blood clots like warfarin, enoxaparin, dalteparin, apixaban, dabigatran, and rivaroxaban  · diuretics  · female hormones, like estrogens and birth  control pills  This list may not describe all possible interactions. Give your health care provider a list of all the medicines, herbs, non-prescription drugs, or dietary supplements you use. Also tell them if you smoke, drink alcohol, or use illegal drugs. Some items may interact with your medicine.  What should I watch for while using this medicine?  You may need blood work done while you are taking this medicine.  Follow a good diet and exercise plan. Taking this medicine does not replace a healthy lifestyle. Some foods that have omega-3 fatty acids naturally are fatty fish like albacore tuna, halibut, herring, mackerel, lake trout, salmon, and sardines.  If you are scheduled for any medical or dental procedure, tell your healthcare provider that you are taking this medicine. You may need to stop taking this medicine before the procedure.  What side effects may I notice from receiving this medicine?  Side effects that you should report to your doctor or health care professional as soon as possible:  · allergic reactions like skin rash, itching or hives, swelling of the face, lips, or tongue  · breathing problems  · unusual bleeding or bruising  · fast, irregular heartbeat  Side effects that usually do not require medical attention (report to your doctor or health care professional if they continue or are bothersome):  · muscle or joint pain  · sore throat  · swelling of the ankles, feet, hands  · constipation  · Gout  This list may not describe all possible side effects. Call your doctor for medical advice about side effects. You may report side effects to FDA at 4-150-FDA-5873.  Where should I keep my medicine?  Keep out of the reach of children.  Store at room temperature between 15 and 30 degrees C (59 and 86 degrees F). Throw away any unused medicine after the expiration date.  NOTE: This sheet is a summary. It may not cover all possible information. If you have questions about this medicine, talk to your  "doctor, pharmacist, or health care provider.  © 2021 Elsevier/Gold Standard (2019-12-17 18:35:46)  Td (Tetanus, Diphtheria) Vaccine: What You Need to Know  1. Why get vaccinated?  Td vaccine can prevent tetanus and diphtheria.  Tetanus enters the body through cuts or wounds. Diphtheria spreads from person to person.  · TETANUS (T) causes painful stiffening of the muscles. Tetanus can lead to serious health problems, including being unable to open the mouth, having trouble swallowing and breathing, or death.  · DIPHTHERIA (D) can lead to difficulty breathing, heart failure, paralysis, or death.  2. Td vaccine  Td is only for children 7 years and older, adolescents, and adults.   Td is usually given as a booster dose every 10 years, but it can also be given earlier after a severe and dirty wound or burn.  Another vaccine, called Tdap, that protects against pertussis, also known as \"whooping cough,\" in addition to tetanus and diphtheria, may be used instead of Td.   Td may be given at the same time as other vaccines.  3. Talk with your health care provider  Tell your vaccine provider if the person getting the vaccine:  · Has had an allergic reaction after a previous dose of any vaccine that protects against tetanus or diphtheria, or has any severe, life-threatening allergies.  · Has ever had Guillain-Barré Syndrome (also called GBS).  · Has had severe pain or swelling after a previous dose of any vaccine that protects against tetanus or diphtheria.  In some cases, your health care provider may decide to postpone Td vaccination to a future visit.   People with minor illnesses, such as a cold, may be vaccinated. People who are moderately or severely ill should usually wait until they recover before getting Td vaccine.   Your health care provider can give you more information.  4. Risks of a vaccine reaction  · Pain, redness, or swelling where the shot was given, mild fever, headache, feeling tired, and nausea, vomiting, " diarrhea, or stomachache sometimes happen after Td vaccine.  People sometimes faint after medical procedures, including vaccination. Tell your provider if you feel dizzy or have vision changes or ringing in the ears.   As with any medicine, there is a very remote chance of a vaccine causing a severe allergic reaction, other serious injury, or death.  5. What if there is a serious problem?  An allergic reaction could occur after the vaccinated person leaves the clinic. If you see signs of a severe allergic reaction (hives, swelling of the face and throat, difficulty breathing, a fast heartbeat, dizziness, or weakness), call 9-1-1 and get the person to the nearest hospital.   For other signs that concern you, call your health care provider.   Adverse reactions should be reported to the Vaccine Adverse Event Reporting System (VAERS). Your health care provider will usually file this report, or you can do it yourself. Visit the VAERS website at www.vaers.hhs.gov or call 1-522.822.7025. VAERS is only for reporting reactions, and VAERS staff do not give medical advice.  6. The National Vaccine Injury Compensation Program  The National Vaccine Injury Compensation Program (VICP) is a federal program that was created to compensate people who may have been injured by certain vaccines. Visit the VICP website at www.hrsa.gov/vaccinecompensation or call 1-914.883.8915 to learn about the program and about filing a claim. There is a time limit to file a claim for compensation.  7. How can I learn more?  · Ask your health care provider.  · Call your local or state health department.  · Contact the Centers for Disease Control and Prevention (CDC):  ? Call 1-388.455.1332 (6-073-TZR-INFO) or  ? Visit CDC's website at www.cdc.gov/vaccines  Vaccine Information Statement Td Vaccine (04/01/20)  This information is not intended to replace advice given to you by your health care provider. Make sure you discuss any questions you have with  "your health care provider.  Document Revised: 05/11/2020 Document Reviewed: 04/13/2020  Elsevier Patient Education © 2020 [employer]  Td (Tetanus, Diphtheria) Vaccine: What You Need to Know  1. Why get vaccinated?  Td vaccine can prevent tetanus and diphtheria.  Tetanus enters the body through cuts or wounds. Diphtheria spreads from person to person.  · TETANUS (T) causes painful stiffening of the muscles. Tetanus can lead to serious health problems, including being unable to open the mouth, having trouble swallowing and breathing, or death.  · DIPHTHERIA (D) can lead to difficulty breathing, heart failure, paralysis, or death.  2. Td vaccine  Td is only for children 7 years and older, adolescents, and adults.   Td is usually given as a booster dose every 10 years, but it can also be given earlier after a severe and dirty wound or burn.  Another vaccine, called Tdap, that protects against pertussis, also known as \"whooping cough,\" in addition to tetanus and diphtheria, may be used instead of Td.   Td may be given at the same time as other vaccines.  3. Talk with your health care provider  Tell your vaccine provider if the person getting the vaccine:  · Has had an allergic reaction after a previous dose of any vaccine that protects against tetanus or diphtheria, or has any severe, life-threatening allergies.  · Has ever had Guillain-Barré Syndrome (also called GBS).  · Has had severe pain or swelling after a previous dose of any vaccine that protects against tetanus or diphtheria.  In some cases, your health care provider may decide to postpone Td vaccination to a future visit.   People with minor illnesses, such as a cold, may be vaccinated. People who are moderately or severely ill should usually wait until they recover before getting Td vaccine.   Your health care provider can give you more information.  4. Risks of a vaccine reaction  · Pain, redness, or swelling where the shot was given, mild fever, headache, " feeling tired, and nausea, vomiting, diarrhea, or stomachache sometimes happen after Td vaccine.  People sometimes faint after medical procedures, including vaccination. Tell your provider if you feel dizzy or have vision changes or ringing in the ears.   As with any medicine, there is a very remote chance of a vaccine causing a severe allergic reaction, other serious injury, or death.  5. What if there is a serious problem?  An allergic reaction could occur after the vaccinated person leaves the clinic. If you see signs of a severe allergic reaction (hives, swelling of the face and throat, difficulty breathing, a fast heartbeat, dizziness, or weakness), call 9-1-1 and get the person to the nearest hospital.   For other signs that concern you, call your health care provider.   Adverse reactions should be reported to the Vaccine Adverse Event Reporting System (VAERS). Your health care provider will usually file this report, or you can do it yourself. Visit the VAERS website at www.vaers.hhs.gov or call 1-428.320.5717. VAERS is only for reporting reactions, and VAERS staff do not give medical advice.  6. The National Vaccine Injury Compensation Program  The National Vaccine Injury Compensation Program (VICP) is a federal program that was created to compensate people who may have been injured by certain vaccines. Visit the VICP website at www.hrsa.gov/vaccinecompensation or call 1-839.771.2670 to learn about the program and about filing a claim. There is a time limit to file a claim for compensation.  7. How can I learn more?  · Ask your health care provider.  · Call your local or state health department.  · Contact the Centers for Disease Control and Prevention (CDC):  ? Call 1-724.661.6527 (7-966-SHJ-INFO) or  ? Visit CDC's website at www.cdc.gov/vaccines  Vaccine Information Statement Td Vaccine (04/01/20)  This information is not intended to replace advice given to you by your health care provider. Make sure you  discuss any questions you have with your health care provider.  Document Revised: 05/11/2020 Document Reviewed: 04/13/2020  Elsevier Patient Education © 2020 Elsevier Inc.  Pneumococcal Conjugate Vaccine (PCV13): What You Need to Know  1. Why get vaccinated?  Pneumococcal conjugate vaccine (PCV13) can prevent pneumococcal disease.  Pneumococcal disease refers to any illness caused by pneumococcal bacteria. These bacteria can cause many types of illnesses, including pneumonia, which is an infection of the lungs. Pneumococcal bacteria are one of the most common causes of pneumonia.  Besides pneumonia, pneumococcal bacteria can also cause:  · Ear infections  · Sinus infections  · Meningitis (infection of the tissue covering the brain and spinal cord)  · Bacteremia (bloodstream infection)  Anyone can get pneumococcal disease, but children under 2 years of age, people with certain medical conditions, adults 65 years or older, and cigarette smokers are at the highest risk.  Most pneumococcal infections are mild. However, some can result in long-term problems, such as brain damage or hearing loss. Meningitis, bacteremia, and pneumonia caused by pneumococcal disease can be fatal.  2. PCV13  PCV13 protects against 13 types of bacteria that cause pneumococcal disease.  Infants and young children usually need 4 doses of pneumococcal conjugate vaccine, at 2, 4, 6, and 12-15 months of age. In some cases, a child might need fewer than 4 doses to complete PCV13 vaccination.  A dose of PCV23 vaccine is also recommended for anyone 2 years or older with certain medical conditions if they did not already receive PCV13.  This vaccine may be given to adults 65 years or older based on discussions between the patient and health care provider.  3. Talk with your health care provider  Tell your vaccine provider if the person getting the vaccine:  · Has had an allergic reaction after a previous dose of PCV13, to an earlier pneumococcal  conjugate vaccine known as PCV7, or to any vaccine containing diphtheria toxoid (for example, DTaP), or has any severe, life-threatening allergies.  · In some cases, your health care provider may decide to postpone PCV13 vaccination to a future visit.  People with minor illnesses, such as a cold, may be vaccinated. People who are moderately or severely ill should usually wait until they recover before getting PCV13.  Your health care provider can give you more information.  4. Risks of a vaccine reaction  · Redness, swelling, pain, or tenderness where the shot is given, and fever, loss of appetite, fussiness (irritability), feeling tired, headache, and chills can happen after PCV13.  Young children may be at increased risk for seizures caused by fever after PCV13 if it is administered at the same time as inactivated influenza vaccine. Ask your health care provider for more information.  People sometimes faint after medical procedures, including vaccination. Tell your provider if you feel dizzy or have vision changes or ringing in the ears.  As with any medicine, there is a very remote chance of a vaccine causing a severe allergic reaction, other serious injury, or death.  5. What if there is a serious problem?  An allergic reaction could occur after the vaccinated person leaves the clinic. If you see signs of a severe allergic reaction (hives, swelling of the face and throat, difficulty breathing, a fast heartbeat, dizziness, or weakness), call 9-1-1 and get the person to the nearest hospital.  For other signs that concern you, call your health care provider.  Adverse reactions should be reported to the Vaccine Adverse Event Reporting System (VAERS). Your health care provider will usually file this report, or you can do it yourself. Visit the VAERS website at www.vaers.hhs.gov or call 1-707.430.5203. VAERS is only for reporting reactions, and VAERS staff do not give medical advice.  6. The National Vaccine Injury  Compensation Program  The National Vaccine Injury Compensation Program (VICP) is a federal program that was created to compensate people who may have been injured by certain vaccines. Visit the VICP website at www.[facility].gov/vaccinecompensation or call 1-641.331.4528 to learn about the program and about filing a claim. There is a time limit to file a claim for compensation.  7. How can I learn more?  · Ask your health care provider.  · Call your local or state health department.  · Contact the Centers for Disease Control and Prevention (CDC):  ? Call 1-326.259.3661 (6-994-GAK-INFO) or  ? Visit CDC's website at www.cdc.gov/vaccines  Vaccine Information Statement PCV13 Vaccine (10/30/2019)  This information is not intended to replace advice given to you by your health care provider. Make sure you discuss any questions you have with your health care provider.  Document Revised: 04/07/2020 Document Reviewed: 07/30/2019  Elsevier Patient Education © 2020 Elsevier Inc.

## 2021-03-12 ENCOUNTER — TELEPHONE (OUTPATIENT)
Dept: FAMILY MEDICINE CLINIC | Facility: CLINIC | Age: 66
End: 2021-03-12

## 2021-03-12 NOTE — TELEPHONE ENCOUNTER
Spoke to wife and made aware that referral was for a colonoscopy and that was discussed on his previous visit. She voiced understanding.

## 2021-03-12 NOTE — TELEPHONE ENCOUNTER
Patients wife called and states she received a call regarding something she thinks was about a colonoscopy.  Please return call

## 2021-03-16 RX ORDER — DAPAGLIFLOZIN 10 MG/1
10 TABLET, FILM COATED ORAL DAILY
Qty: 30 TABLET | Refills: 3 | Status: SHIPPED | OUTPATIENT
Start: 2021-03-16 | End: 2021-09-10

## 2021-03-18 DIAGNOSIS — M25.511 CHRONIC RIGHT SHOULDER PAIN: Primary | ICD-10-CM

## 2021-03-18 DIAGNOSIS — G89.29 CHRONIC RIGHT SHOULDER PAIN: Primary | ICD-10-CM

## 2021-03-19 ENCOUNTER — TELEPHONE (OUTPATIENT)
Dept: FAMILY MEDICINE CLINIC | Facility: CLINIC | Age: 66
End: 2021-03-19

## 2021-03-19 NOTE — TELEPHONE ENCOUNTER
----- Message from Gage Means MD sent at 3/19/2021 12:23 PM CDT -----  Please let pt know x-ray of shoulder shows degnerative changes of AC joint. If pt agreeable would refer to Orthopedic at . Let me know and I will put in order. Thanks

## 2021-03-22 DIAGNOSIS — G89.29 CHRONIC RIGHT SHOULDER PAIN: Primary | ICD-10-CM

## 2021-03-22 DIAGNOSIS — M19.019 AC JOINT ARTHROPATHY: ICD-10-CM

## 2021-03-22 DIAGNOSIS — M25.511 CHRONIC RIGHT SHOULDER PAIN: Primary | ICD-10-CM

## 2021-03-25 ENCOUNTER — OFFICE VISIT (OUTPATIENT)
Dept: ORTHOPEDIC SURGERY | Facility: CLINIC | Age: 66
End: 2021-03-25

## 2021-03-25 VITALS — HEIGHT: 68 IN | WEIGHT: 187.6 LBS | BODY MASS INDEX: 28.43 KG/M2

## 2021-03-25 DIAGNOSIS — M25.511 RIGHT SHOULDER PAIN, UNSPECIFIED CHRONICITY: Primary | ICD-10-CM

## 2021-03-25 PROCEDURE — 20610 DRAIN/INJ JOINT/BURSA W/O US: CPT | Performed by: NURSE PRACTITIONER

## 2021-03-25 PROCEDURE — 99203 OFFICE O/P NEW LOW 30 MIN: CPT | Performed by: NURSE PRACTITIONER

## 2021-03-25 RX ORDER — LIDOCAINE HYDROCHLORIDE 10 MG/ML
2 INJECTION, SOLUTION INFILTRATION; PERINEURAL
Status: COMPLETED | OUTPATIENT
Start: 2021-03-25 | End: 2021-03-25

## 2021-03-25 RX ORDER — MELOXICAM 15 MG/1
15 TABLET ORAL DAILY
Qty: 14 TABLET | Refills: 0 | Status: SHIPPED | OUTPATIENT
Start: 2021-03-25 | End: 2021-04-08

## 2021-03-25 RX ORDER — TRIAMCINOLONE ACETONIDE 40 MG/ML
40 INJECTION, SUSPENSION INTRA-ARTICULAR; INTRAMUSCULAR
Status: COMPLETED | OUTPATIENT
Start: 2021-03-25 | End: 2021-03-25

## 2021-03-25 RX ADMIN — LIDOCAINE HYDROCHLORIDE 2 ML: 10 INJECTION, SOLUTION INFILTRATION; PERINEURAL at 08:21

## 2021-03-25 RX ADMIN — TRIAMCINOLONE ACETONIDE 40 MG: 40 INJECTION, SUSPENSION INTRA-ARTICULAR; INTRAMUSCULAR at 08:21

## 2021-03-25 NOTE — PROGRESS NOTES
Juan Cheng is a 65 y.o. male   Primary provider:  Gage Means MD       Chief Complaint   Patient presents with   • Right Shoulder - Pain       HISTORY OF PRESENT ILLNESS: Patient is a 65-year-old male who presents today with complaints of right shoulder pain that has been present for the past 4 months.  Patient reports that pain started after he threw a light at a dog, he reports that his throat was an overhead throw.  He reports his pain is moderate and intermittent.  He reports the pain is aching.  Pain is made worse with overhead activity and reaching behind him as if to get his wallet out of his pocket.  He has tried NSAIDs, Tylenol, topical Voltaren without relief of symptoms.  He reports that pain bugs him mostly at night.  He has no complaints of burning, tingling, numbness.  Patient had recent x-rays on 3/18/2021 which did not show any acute bony abnormality.      Pain  This is a chronic problem. The current episode started more than 1 month ago. The problem occurs intermittently. The problem has been gradually worsening. Associated symptoms include arthralgias. The symptoms are aggravated by exertion. He has tried NSAIDs for the symptoms. The treatment provided mild relief.        CONCURRENT MEDICAL HISTORY:    Past Medical History:   Diagnosis Date   • Diabetes mellitus (CMS/[facility])    • Hyperlipidemia        No Known Allergies      Current Outpatient Medications:   •  Acetaminophen (TYLENOL ARTHRITIS PAIN PO), Take  by mouth., Disp: , Rfl:   •  aspirin 81 MG EC tablet, Take 81 mg by mouth Daily., Disp: , Rfl:   •  Dapagliflozin Propanediol (Farxiga) 10 MG tablet, Take 10 mg by mouth Daily., Disp: 30 tablet, Rfl: 3  •  Diclofenac Sodium (VOLTAREN) 1 % gel gel, Apply 4 g topically to the appropriate area as directed 2 (Two) Times a Day., Disp: 100 g, Rfl: 2  •  icosapent ethyl (Vascepa) 1 g capsule capsule, Take 2 g by mouth 2 (Two) Times a Day With Meals., Disp: 120 capsule, Rfl: 3  •  metFORMIN  "(GLUCOPHAGE) 1000 MG tablet, , Disp: , Rfl:   •  metoprolol succinate XL (TOPROL-XL) 25 MG 24 hr tablet, , Disp: , Rfl:   •  rosuvastatin (CRESTOR) 10 MG tablet, , Disp: , Rfl:   •  vitamin B-12 (CYANOCOBALAMIN) 500 MCG tablet, Take 1 tablet by mouth Daily., Disp: 30 tablet, Rfl: 3  •  vitamin D (ERGOCALCIFEROL) 1.25 MG (27480 UT) capsule capsule, Take 1 capsule by mouth Every 7 (Seven) Days., Disp: 4 capsule, Rfl: 3  •  meloxicam (Mobic) 15 MG tablet, Take 1 tablet by mouth Daily for 14 days., Disp: 14 tablet, Rfl: 0  •  Omega-3 Fatty Acids (fish oil) 1000 MG capsule capsule, Omega 3, Disp: , Rfl:     Past Surgical History:   Procedure Laterality Date   • APPENDECTOMY     • CARDIAC SURGERY     • CHOLECYSTECTOMY         Family History   Problem Relation Age of Onset   • Heart disease Father    • Alcohol abuse Father    • Cancer Brother         Social History     Socioeconomic History   • Marital status:      Spouse name: Not on file   • Number of children: Not on file   • Years of education: Not on file   • Highest education level: Not on file   Tobacco Use   • Smoking status: Former Smoker   • Smokeless tobacco: Never Used   Substance and Sexual Activity   • Alcohol use: Not Currently   • Drug use: Never   • Sexual activity: Defer        Review of Systems   Constitutional: Negative.    HENT: Negative.    Eyes: Negative.    Respiratory: Negative.    Cardiovascular: Negative.    Gastrointestinal: Negative.    Endocrine: Negative.    Genitourinary: Negative.    Musculoskeletal: Positive for arthralgias.        Right shoulder pain   Skin: Negative.    Allergic/Immunologic: Negative.    Neurological: Negative.    Hematological: Negative.    Psychiatric/Behavioral: Negative.        PHYSICAL EXAMINATION:       Ht 172.7 cm (68\")   Wt 85.1 kg (187 lb 9.6 oz)   BMI 28.52 kg/m²     Physical Exam  Vitals and nursing note reviewed.   Constitutional:       General: He is not in acute distress.     Appearance: He is " well-developed. He is not toxic-appearing.   HENT:      Head: Normocephalic.   Pulmonary:      Effort: Pulmonary effort is normal. No respiratory distress.   Skin:     General: Skin is warm and dry.   Neurological:      Mental Status: He is alert and oriented to person, place, and time.   Psychiatric:         Behavior: Behavior normal.         Thought Content: Thought content normal.         Judgment: Judgment normal.         GAIT:     [x]  Normal  []  Antalgic    Assistive device: [x]  None  []  Walker     []  Crutches  []  Cane     []  Wheelchair  []  Stretcher    Right Shoulder Exam     Tenderness   The patient is experiencing tenderness in the acromion and acromioclavicular joint.    Range of Motion   Active abduction: 90   Extension: normal   External rotation: 70   Forward flexion: 120   Internal rotation 90 degrees: 70     Tests   Jasmine test: negative  Cross arm: negative  Impingement: negative  Drop arm: negative    Comments:  - lift off  - impingement  - full can  +empty can               XR Shoulder 2+ View Right    Result Date: 3/19/2021  Narrative: Three view right shoulder HISTORY: Right shoulder pain. Chronic pain. AP films with the humerus in internal and external rotation and scapular Y view were obtained. COMPARISON: None FINDINGS: Hypertrophic change acromioclavicular joint. 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 No fracture or dislocation. No other osseous or articular abnormality. Sternotomy.     Impression: CONCLUSION: Hypertrophic change acromioclavicular joint. [id number] Electronically signed by:  Isaiah Hurley MD  3/19/2021 9:04 AM CDT Workstation: [id number]          ASSESSMENT:    Diagnoses and all orders for this visit:    Right shoulder pain, unspecified chronicity    Other orders  -     meloxicam (Mobic) 15 MG tablet; Take 1 tablet by mouth Daily for 14 days.  -     Large Joint Arthrocentesis: R subacromial bursa          PLAN      Patient's Body mass index is 28.52  kg/m².     X-rays reviewed, no acute bony abnormality seen.  Patient does have special testing and decreased range of motion today that would suggest possible rotator cuff injury.  Patient given prescription for prescription strength NSAID, how to take medication properly and safely explained.  Patient instructed to use no other NSAIDs other than Voltaren gel and a baby aspirin while taking Mobic.  Patient verbalized understanding.  Patient to continue with activity modification, rice therapy.  Recommend PT, patient declines.  Patient given HEP for gentle range of motion exercises.  Risk, benefits, alternatives to subacromial injection reviewed with patient today.  Patient verbalized understanding and desires to proceed with this.  Patient tolerated injection well.  Patient to return in 4 weeks for recheck, if not significantly better will consider PT or MRI at this time.      Return in about 4 weeks (around 4/22/2021).       Large Joint Arthrocentesis: R subacromial bursa  Date/Time: 3/25/2021 8:21 AM  Consent given by: patient  Site marked: site marked  Timeout: Immediately prior to procedure a time out was called to verify the correct patient, procedure, equipment, support staff and site/side marked as required   Supporting Documentation  Indications: pain   Procedure Details  Location: shoulder - R subacromial bursa  Preparation: Patient was prepped and draped in the usual sterile fashion  Needle size: 22 G  Approach: posterior  Medications administered: 40 mg triamcinolone acetonide 40 MG/ML; 2 mL lidocaine 1 %  Patient tolerance: patient tolerated the procedure well with no immediate complications          ASTRID Bermudez

## 2021-04-01 ENCOUNTER — OFFICE VISIT (OUTPATIENT)
Dept: GASTROENTEROLOGY | Facility: CLINIC | Age: 66
End: 2021-04-01

## 2021-04-01 VITALS
SYSTOLIC BLOOD PRESSURE: 148 MMHG | HEART RATE: 83 BPM | DIASTOLIC BLOOD PRESSURE: 62 MMHG | WEIGHT: 185 LBS | BODY MASS INDEX: 28.04 KG/M2 | HEIGHT: 68 IN

## 2021-04-01 DIAGNOSIS — Z12.11 ENCOUNTER FOR COLONOSCOPY DUE TO HISTORY OF COLONIC POLYP: Primary | ICD-10-CM

## 2021-04-01 DIAGNOSIS — Z86.010 ENCOUNTER FOR COLONOSCOPY DUE TO HISTORY OF COLONIC POLYP: Primary | ICD-10-CM

## 2021-04-01 PROBLEM — I51.9 HEART DISEASE: Status: ACTIVE | Noted: 2019-05-13

## 2021-04-01 PROCEDURE — S0260 H&P FOR SURGERY: HCPCS | Performed by: NURSE PRACTITIONER

## 2021-04-01 RX ORDER — SODIUM, POTASSIUM,MAG SULFATES 17.5-3.13G
2 SOLUTION, RECONSTITUTED, ORAL ORAL EVERY 12 HOURS
Qty: 177 ML | Refills: 0 | Status: SHIPPED | OUTPATIENT
Start: 2021-04-01 | End: 2021-05-11

## 2021-04-01 RX ORDER — CHLORAL HYDRATE 500 MG
1000 CAPSULE ORAL
COMMUNITY

## 2021-04-01 RX ORDER — DEXTROSE AND SODIUM CHLORIDE 5; .45 G/100ML; G/100ML
30 INJECTION, SOLUTION INTRAVENOUS CONTINUOUS PRN
Status: CANCELLED | OUTPATIENT
Start: 2021-05-03

## 2021-04-01 NOTE — PATIENT INSTRUCTIONS
Colorectal Cancer Screening    Colorectal cancer screening is a group of tests that are used to check for colorectal cancer before symptoms develop. Colorectal refers to the colon and rectum. The colon and rectum are located at the end of the digestive tract and carry bowel movements out of the body.  Who should have screening?  All adults starting at age 50 until age 75 should have screening. Your health care provider may recommend screening at age 45. You will have tests every 1-10 years, depending on your results and the type of screening test.  You may have screening tests starting at an earlier age, or more frequently than other people, if you have any of the following risk factors:  · A personal or family history of colorectal cancer or abnormal growths (polyps).  · Inflammatory bowel disease, such as ulcerative colitis or Crohn's disease.  · A history of having radiation treatment to the abdomen or pelvic area for cancer.  · Colorectal cancer symptoms, such as changes in bowel habits or blood in your stool.  · A type of colon cancer syndrome that is passed from parent to child (hereditary), such as:  ? Isbell syndrome.  ? Familial adenomatous polyposis.  ? Turcot syndrome.  ? Peutz-Jeghers syndrome.  Screening recommendations for adults who are 75-85 years old vary depending on health.  How is screening done?  There are several types of colorectal screening tests. You may have one or more of the following:  · Guaiac-based fecal occult blood testing. For this test, a stool (feces) sample is checked for hidden (occult) blood, which could be a sign of colorectal cancer.  · Fecal immunochemical test (FIT). For this test, a stool sample is checked for blood, which could be a sign of colorectal cancer.  · Stool DNA test. For this test, a stool sample is checked for blood and changes in DNA that could lead to colorectal cancer.  · Sigmoidoscopy. During this test, a thin, flexible tube with a camera on the end  (sigmoidoscope) is used to examine the rectum and the lower colon.  · Colonoscopy. During this test, a long, flexible tube with a camera on the end (colonoscope) is used to examine the entire colon and rectum. With a colonoscopy, it is possible to take a sample of tissue (biopsy) and remove small polyps during the test.  · Virtual colonoscopy. Instead of a colonoscope, this type of colonoscopy uses X-rays (CT scan) and computers to produce images of the colon and rectum.  What are the benefits of screening?  Screening reduces your risk for colorectal cancer and can help identify cancer at an early stage, when the cancer can be removed or treated more easily. It is common for polyps to form in the lining of the colon, especially as you age. These polyps may be cancerous or become cancerous over time. Screening can identify these polyps.  What are the risks of screening?  Each screening test may have different risks.  · Stool sample tests have fewer risks than other types of screening tests. However, you may need more tests to confirm results from a stool sample test.  · Screening tests that involve X-rays expose you to low levels of radiation, which may slightly increase your cancer risk. The benefit of detecting cancer outweighs the slight increase in risk.  · Screening tests such as sigmoidoscopy and colonoscopy may place you at risk for bleeding, intestinal damage, infection, or a reaction to medicines given during the exam.  Talk with your health care provider to understand your risk for colorectal cancer and to make a screening plan that is right for you.  Questions to ask your health care provider  · When should I start colorectal cancer screening?  · What is my risk for colorectal cancer?  · How often do I need screening?  · Which screening tests do I need?  · How do I get my test results?  · What do my results mean?  Where to find more information  Learn more about colorectal cancer screening from:  · The  American Cancer Society: www.cancer.org  · The [employer]: www.cancer.gov  Summary  · Colorectal cancer screening is a group of tests used to check for colorectal cancer before symptoms develop.  · Screening reduces your risk for colorectal cancer and can help identify cancer at an early stage, when the cancer can be removed or treated more easily.  · All adults starting at age 50 until age 75 should have screening. Your health care provider may recommend screening at age 45.  · You may have screening tests starting at an earlier age, or more frequently than other people, if you have certain risk factors.  · Talk with your health care provider to understand your risk for colorectal cancer and to make a screening plan that is right for you.  This information is not intended to replace advice given to you by your health care provider. Make sure you discuss any questions you have with your health care provider.  Document Revised: 04/08/2020 Document Reviewed: 09/19/2018  Elsevier Patient Education © 2021 [employer]

## 2021-04-01 NOTE — PROGRESS NOTES
Chief Complaint   Patient presents with   • Colon Cancer Screening       Subjective    Juan Cheng is a 65 y.o. male. he is here today.    History of Present Illness  65-year-old male presents to discuss screening colonoscopy.  Reports prior colonoscopies were completed in [place] he had 1 polyp on initial colonoscopy and reports he had 5 polyps 3 years ago and told to repeat in 3 years for surveillance.  He has family history of colon cancer in his brother who  4 years ago with colon cancer.  Plan; schedule patient for screening colonoscopy due to personal history of colonic polyp.       The following portions of the patient's history were reviewed and updated as appropriate:   Past Medical History:   Diagnosis Date   • Diabetes mellitus (CMS/HCC)    • Hyperlipidemia      Past Surgical History:   Procedure Laterality Date   • APPENDECTOMY     • CARDIAC SURGERY     • CHOLECYSTECTOMY       Family History   Problem Relation Age of Onset   • Heart disease Father    • Alcohol abuse Father    • Cancer Brother        Prior to Admission medications    Medication Sig Start Date End Date Taking? Authorizing Provider   Acetaminophen (TYLENOL ARTHRITIS PAIN PO) Take  by mouth.   Yes Gertrudis Mcdonnell MD   aspirin 81 MG EC tablet Take 81 mg by mouth Daily.   Yes Gertrudis Mcdonnell MD   Dapagliflozin Propanediol (Farxiga) 10 MG tablet Take 10 mg by mouth Daily. 3/16/21  Yes Gage Means MD   Diclofenac Sodium (VOLTAREN) 1 % gel gel Apply 4 g topically to the appropriate area as directed 2 (Two) Times a Day. 3/5/21  Yes Gage Means MD   icosapent ethyl (Vascepa) 1 g capsule capsule Take 2 g by mouth 2 (Two) Times a Day With Meals. 3/5/21  Yes Gage Means MD   meloxicam (Mobic) 15 MG tablet Take 1 tablet by mouth Daily for 14 days. 3/25/21 4/8/21 Yes Oma Woodson APRN   metFORMIN (GLUCOPHAGE) 1000 MG tablet  20  Yes Gertrudis Mcdonnell MD   metoprolol succinate XL (TOPROL-XL) 25 MG 24 hr tablet   "12/5/20  Yes Gertrudis Mcdonnell MD   Omega-3 Fatty Acids (fish oil) 1000 MG capsule capsule Take 1,000 mg by mouth.   Yes Gertrudis Mcdonnell MD   rosuvastatin (CRESTOR) 10 MG tablet  1/1/21  Yes Gertrudis Mcdonnell MD   vitamin B-12 (CYANOCOBALAMIN) 500 MCG tablet Take 1 tablet by mouth Daily. 3/5/21  Yes Gage Means MD   vitamin D (ERGOCALCIFEROL) 1.25 MG (82755 UT) capsule capsule Take 1 capsule by mouth Every 7 (Seven) Days. 2/1/21  Yes Gage Means MD   Omega-3 Fatty Acids (fish oil) 1000 MG capsule capsule Omega 3  4/1/21  ProviderGertrudis MD     No Known Allergies  Social History     Socioeconomic History   • Marital status:      Spouse name: Not on file   • Number of children: Not on file   • Years of education: Not on file   • Highest education level: Not on file   Tobacco Use   • Smoking status: Former Smoker   • Smokeless tobacco: Never Used   Substance and Sexual Activity   • Alcohol use: Not Currently   • Drug use: Never   • Sexual activity: Defer       Review of Systems  Review of Systems   Constitutional: Negative for activity change, appetite change, chills, diaphoresis, fatigue, fever and unexpected weight change.   Respiratory: Negative for cough and shortness of breath.    Gastrointestinal: Positive for constipation. Negative for abdominal distention, abdominal pain, anal bleeding, blood in stool, diarrhea, nausea, rectal pain and vomiting.        /62 (BP Location: Left arm)   Pulse 83   Ht 172.7 cm (68\")   Wt 83.9 kg (185 lb)   BMI 28.13 kg/m²     Objective    Physical Exam  Constitutional:       Appearance: Normal appearance.   Pulmonary:      Effort: Pulmonary effort is normal.   Abdominal:      General: Bowel sounds are normal. There is no distension.      Palpations: Abdomen is soft.      Tenderness: There is no abdominal tenderness.   Neurological:      Mental Status: He is alert.       Lab on 01/28/2021   Component Date Value Ref Range Status   • WBC " 01/28/2021 10.21  3.40 - 10.80 10*3/mm3 Final   • RBC 01/28/2021 5.43  4.14 - 5.80 10*6/mm3 Final   • Hemoglobin 01/28/2021 16.4  13.0 - 17.7 g/dL Final   • Hematocrit 01/28/2021 47.7  37.5 - 51.0 % Final   • MCV 01/28/2021 87.8  79.0 - 97.0 fL Final   • MCH 01/28/2021 30.2  26.6 - 33.0 pg Final   • MCHC 01/28/2021 34.4  31.5 - 35.7 g/dL Final   • RDW 01/28/2021 12.9  12.3 - 15.4 % Final   • RDW-SD 01/28/2021 40.0  37.0 - 54.0 fl Final   • MPV 01/28/2021 10.1  6.0 - 12.0 fL Final   • Platelets 01/28/2021 276  140 - 450 10*3/mm3 Final   • Neutrophil % 01/28/2021 44.5  42.7 - 76.0 % Final   • Lymphocyte % 01/28/2021 46.1* 19.6 - 45.3 % Final   • Monocyte % 01/28/2021 5.9  5.0 - 12.0 % Final   • Eosinophil % 01/28/2021 1.9  0.3 - 6.2 % Final   • Basophil % 01/28/2021 1.2  0.0 - 1.5 % Final   • Immature Grans % 01/28/2021 0.4  0.0 - 0.5 % Final   • Neutrophils, Absolute 01/28/2021 4.55  1.70 - 7.00 10*3/mm3 Final   • Lymphocytes, Absolute 01/28/2021 4.71* 0.70 - 3.10 10*3/mm3 Final   • Monocytes, Absolute 01/28/2021 0.60  0.10 - 0.90 10*3/mm3 Final   • Eosinophils, Absolute 01/28/2021 0.19  0.00 - 0.40 10*3/mm3 Final   • Basophils, Absolute 01/28/2021 0.12  0.00 - 0.20 10*3/mm3 Final   • Immature Grans, Absolute 01/28/2021 0.04  0.00 - 0.05 10*3/mm3 Final   • nRBC 01/28/2021 0.0  0.0 - 0.2 /100 WBC Final   • Glucose 01/28/2021 119* 65 - 99 mg/dL Final   • BUN 01/28/2021 23  8 - 23 mg/dL Final   • Creatinine 01/28/2021 0.79  0.76 - 1.27 mg/dL Final   • Sodium 01/28/2021 137  136 - 145 mmol/L Final   • Potassium 01/28/2021 4.6  3.5 - 5.2 mmol/L Final   • Chloride 01/28/2021 100  98 - 107 mmol/L Final   • CO2 01/28/2021 27.7  22.0 - 29.0 mmol/L Final   • Calcium 01/28/2021 10.2  8.6 - 10.5 mg/dL Final   • Total Protein 01/28/2021 8.0  6.0 - 8.5 g/dL Final   • Albumin 01/28/2021 4.60  3.50 - 5.20 g/dL Final   • ALT (SGPT) 01/28/2021 51* 1 - 41 U/L Final   • AST (SGOT) 01/28/2021 27  1 - 40 U/L Final   • Alkaline Phosphatase  01/28/2021 79  39 - 117 U/L Final   • Total Bilirubin 01/28/2021 0.4  0.0 - 1.2 mg/dL Final   • eGFR Non African Amer 01/28/2021 98  >60 mL/min/1.73 Final   • Globulin 01/28/2021 3.4  gm/dL Final   • A/G Ratio 01/28/2021 1.4  g/dL Final   • BUN/Creatinine Ratio 01/28/2021 29.1* 7.0 - 25.0 Final   • Anion Gap 01/28/2021 9.3  5.0 - 15.0 mmol/L Final   • Hemoglobin A1C 01/28/2021 6.60* 4.80 - 5.60 % Final   • Total Cholesterol 01/28/2021 106  0 - 200 mg/dL Final   • Triglycerides 01/28/2021 194* 0 - 150 mg/dL Final   • HDL Cholesterol 01/28/2021 35* 40 - 60 mg/dL Final   • LDL Cholesterol  01/28/2021 40  0 - 100 mg/dL Final   • VLDL Cholesterol 01/28/2021 31  5 - 40 mg/dL Final   • LDL/HDL Ratio 01/28/2021 0.92   Final   • TSH 01/28/2021 1.200  0.270 - 4.200 uIU/mL Final   • Free T4 01/28/2021 1.35  0.93 - 1.70 ng/dL Final   • 25 Hydroxy, Vitamin D 01/28/2021 22.5* 30.0 - 100.0 ng/ml Final   • Vitamin B-12 01/28/2021 281  211 - 946 pg/mL Final   • Microalbumin/Creatinine Ratio 01/28/2021    Final    Unable to calculate   • Creatinine, Urine 01/28/2021 71.6  mg/dL Final   • Microalbumin, Urine 01/28/2021 <1.2  mg/dL Final   • Hepatitis C Ab 01/28/2021 Non-Reactive  Non-Reactive Final   • Urine Culture 01/28/2021 No growth   Final   • HSV 1 IgM 01/28/2021 1:100* <1:10 titer Final   • HSV 2 IgM 01/28/2021 1:1000* <1:10 titer Final    HSV 1 and HSV 2 share many cross-reacting antigens. Elevated titers  to both HSV 1 and HSV 2 may represent crossreactive HSV antibodies  rather than exposure to both HSV 1 and HSV 2.  Results for this test are for research purposes only by the assay's  . The performance characteristics of this product have  not been established.  Results should not be used as a diagnostic  procedure without confirmation of the diagnosis by another medically  established diagnostic product or procedure.   • HSV 1 IgG, Type Specific 01/28/2021 1.57* 0.00 - 0.90 index Final                                      Negative        <0.91                                   Equivocal 0.91 - 1.09                                   Positive        >1.09   Note: Negative indicates no antibodies detected to   HSV-1. Equivocal may suggest early infection.  If   clinically appropriate, retest at later date. Positive   indicates antibodies detected to HSV-1.   • HSV 2 IgG 01/28/2021 2.03* 0.00 - 0.90 index Final                                     Negative        <0.91                                   Equivocal 0.91 - 1.09                                   Positive        >1.09   Note: Negative indicates no antibodies detected to   HSV-2. Equivocal may suggest early infection.  If   clinically appropriate, retest at later date. Positive   indicates antibodies detected to HSV-2.   • RPR 01/28/2021 Non-Reactive  Non-Reactive Final   • HIV-1/ HIV-2 01/28/2021 Non-Reactive  Non-Reactive Final    A non-reactive test result does not preclude the possibility of exposure to HIV or infection with HIV. An antibody response to recent exposure may take several months to reach detectable levels.   • Color, UA 01/28/2021 Yellow  Yellow, Straw Final   • Appearance, UA 01/28/2021 Clear  Clear Final   • pH, UA 01/28/2021 6.0  5.0 - 8.0 Final   • Specific Gravity, UA 01/28/2021 >=1.030  1.005 - 1.030 Final   • Glucose, UA 01/28/2021 >=1000 mg/dL (3+)* Negative Final   • Ketones, UA 01/28/2021 Trace* Negative Final   • Bilirubin, UA 01/28/2021 Negative  Negative Final   • Blood, UA 01/28/2021 Negative  Negative Final   • Protein, UA 01/28/2021 Negative  Negative Final   • Leuk Esterase, UA 01/28/2021 Negative  Negative Final   • Nitrite, UA 01/28/2021 Negative  Negative Final   • Urobilinogen, UA 01/28/2021 0.2 E.U./dL  0.2 - 1.0 E.U./dL Final   • RBC, UA 01/28/2021 0-2  None Seen, 0-2 /HPF Final   • WBC, UA 01/28/2021 0-2  None Seen, 0-2 /HPF Final   • Bacteria, UA 01/28/2021 None Seen  None Seen /HPF Final   • Squamous Epithelial Cells, UA  01/28/2021 0-2  None Seen, 0-2 /HPF Final   • Hyaline Ashely, UA 01/28/2021 None Seen  None Seen /LPF Final   • Methodology 01/28/2021 Automated Microscopy   Final   • HSV-2 IgG Supplemental Test 01/28/2021 Positive* Negative Final     HSV-2 IgG          HSV-2 IgG  Type Specific      Confirmation      Interpretation  -------------------------------------------------------  Positive/Equivocal   Positive    Indicates the presence                                   of detectable IgG                                   antibodies to HSV-2.  -------------------------------------------------------  Positive/Equivocal   Negative    Unable to confirm the                                   presence of IgG                                   antibodies to HSV-2.                                   Recommend retesting                                   in 2-4 weeks.  -------------------------------------------------------     Assessment/Plan      1. Encounter for colonoscopy due to history of colonic polyp    .       Orders placed during this encounter include:  Orders Placed This Encounter   Procedures   • Follow Anesthesia Guidelines / Standing Orders     Standing Status:   Future   • Obtain Informed Consent     Standing Status:   Future     Order Specific Question:   Informed Consent Given For     Answer:   COLONOSCOPY       COLONOSCOPY a month out (N/A)    Review and/or summary of lab tests, radiology, procedures, medications. Review and summary of old records and obtaining of history. The risks and benefits of my recommendations, as well as other treatment options were discussed with the patient today. Questions were answered.    New Medications Ordered This Visit   Medications   • sodium-potassium-magnesium sulfates (SUPREP) 17.5-3.13-1.6 GM/177ML solution oral solution     Sig: Take 2 bottles by mouth Every 12 (Twelve) Hours.     Dispense:  177 mL     Refill:  0       Follow-up: Return in about 4 weeks (around 4/29/2021) for  Recheck, After test.          This document has been electronically signed by ASTRID Bernard on April 1, 2021 09:54 CDT               Results for orders placed or performed in visit on 01/28/21   HSV-2 IgG Supplemental Test    Specimen: Blood   Result Value Ref Range    HSV-2 IgG Supplemental Test Positive (A) Negative   Urinalysis, Microscopic Only - Urine, Clean Catch    Specimen: Urine, Clean Catch   Result Value Ref Range    RBC, UA 0-2 None Seen, 0-2 /HPF    WBC, UA 0-2 None Seen, 0-2 /HPF    Bacteria, UA None Seen None Seen /HPF    Squamous Epithelial Cells, UA 0-2 None Seen, 0-2 /HPF    Hyaline Casts, UA None Seen None Seen /LPF    Methodology Automated Microscopy    HIV-1 / O / 2 Ag / Antibody 4th Generation    Specimen: Blood   Result Value Ref Range    HIV-1/ HIV-2 Non-Reactive Non-Reactive   HSV 1 and 2 IgM, Abs, Indirect    Specimen: Blood   Result Value Ref Range    HSV 1 IgM 1:100 (H) <1:10 titer    HSV 2 IgM 1:1000 (H) <1:10 titer   HSV 1 and 2-Specific Ab, IgG    Specimen: Blood   Result Value Ref Range    HSV 1 IgG, Type Specific 1.57 (H) 0.00 - 0.90 index    HSV 2 IgG 2.03 (H) 0.00 - 0.90 index   CBC Auto Differential    Specimen: Blood   Result Value Ref Range    WBC 10.21 3.40 - 10.80 10*3/mm3    RBC 5.43 4.14 - 5.80 10*6/mm3    Hemoglobin 16.4 13.0 - 17.7 g/dL    Hematocrit 47.7 37.5 - 51.0 %    MCV 87.8 79.0 - 97.0 fL    MCH 30.2 26.6 - 33.0 pg    MCHC 34.4 31.5 - 35.7 g/dL    RDW 12.9 12.3 - 15.4 %    RDW-SD 40.0 37.0 - 54.0 fl    MPV 10.1 6.0 - 12.0 fL    Platelets 276 140 - 450 10*3/mm3    Neutrophil % 44.5 42.7 - 76.0 %    Lymphocyte % 46.1 (H) 19.6 - 45.3 %    Monocyte % 5.9 5.0 - 12.0 %    Eosinophil % 1.9 0.3 - 6.2 %    Basophil % 1.2 0.0 - 1.5 %    Immature Grans % 0.4 0.0 - 0.5 %    Neutrophils, Absolute 4.55 1.70 - 7.00 10*3/mm3    Lymphocytes, Absolute 4.71 (H) 0.70 - 3.10 10*3/mm3    Monocytes, Absolute 0.60 0.10 - 0.90 10*3/mm3    Eosinophils, Absolute 0.19 0.00 - 0.40 10*3/mm3     Basophils, Absolute 0.12 0.00 - 0.20 10*3/mm3    Immature Grans, Absolute 0.04 0.00 - 0.05 10*3/mm3    nRBC 0.0 0.0 - 0.2 /100 WBC   Hepatitis C Antibody    Specimen: Blood   Result Value Ref Range    Hepatitis C Ab Non-Reactive Non-Reactive   Microalbumin / Creatinine Urine Ratio - Urine, Clean Catch    Specimen: Urine, Clean Catch   Result Value Ref Range    Microalbumin/Creatinine Ratio      Creatinine, Urine 71.6 mg/dL    Microalbumin, Urine <1.2 mg/dL   Vitamin D 25 Hydroxy    Specimen: Blood   Result Value Ref Range    25 Hydroxy, Vitamin D 22.5 (L) 30.0 - 100.0 ng/ml   RPR    Specimen: Blood   Result Value Ref Range    RPR Non-Reactive Non-Reactive   Urinalysis without microscopic (no culture) - Urine, Clean Catch    Specimen: Urine, Clean Catch   Result Value Ref Range    Color, UA Yellow Yellow, Straw    Appearance, UA Clear Clear    pH, UA 6.0 5.0 - 8.0    Specific Gravity, UA >=1.030 1.005 - 1.030    Glucose, UA >=1000 mg/dL (3+) (A) Negative    Ketones, UA Trace (A) Negative    Bilirubin, UA Negative Negative    Blood, UA Negative Negative    Protein, UA Negative Negative    Leuk Esterase, UA Negative Negative    Nitrite, UA Negative Negative    Urobilinogen, UA 0.2 E.U./dL 0.2 - 1.0 E.U./dL   Urine Culture - Urine, Urine, Clean Catch    Specimen: Urine, Clean Catch   Result Value Ref Range    Urine Culture No growth    TSH    Specimen: Blood   Result Value Ref Range    TSH 1.200 0.270 - 4.200 uIU/mL   T4, Free    Specimen: Blood   Result Value Ref Range    Free T4 1.35 0.93 - 1.70 ng/dL   Hemoglobin A1c    Specimen: Blood   Result Value Ref Range    Hemoglobin A1C 6.60 (H) 4.80 - 5.60 %   Vitamin B12    Specimen: Blood   Result Value Ref Range    Vitamin B-12 281 211 - 946 pg/mL   Lipid Panel    Specimen: Blood   Result Value Ref Range    Total Cholesterol 106 0 - 200 mg/dL    Triglycerides 194 (H) 0 - 150 mg/dL    HDL Cholesterol 35 (L) 40 - 60 mg/dL    LDL Cholesterol  40 0 - 100 mg/dL    VLDL  Cholesterol 31 5 - 40 mg/dL    LDL/HDL Ratio 0.92    Comprehensive Metabolic Panel    Specimen: Blood   Result Value Ref Range    Glucose 119 (H) 65 - 99 mg/dL    BUN 23 8 - 23 mg/dL    Creatinine 0.79 0.76 - 1.27 mg/dL    Sodium 137 136 - 145 mmol/L    Potassium 4.6 3.5 - 5.2 mmol/L    Chloride 100 98 - 107 mmol/L    CO2 27.7 22.0 - 29.0 mmol/L    Calcium 10.2 8.6 - 10.5 mg/dL    Total Protein 8.0 6.0 - 8.5 g/dL    Albumin 4.60 3.50 - 5.20 g/dL    ALT (SGPT) 51 (H) 1 - 41 U/L    AST (SGOT) 27 1 - 40 U/L    Alkaline Phosphatase 79 39 - 117 U/L    Total Bilirubin 0.4 0.0 - 1.2 mg/dL    eGFR Non African Amer 98 >60 mL/min/1.73    Globulin 3.4 gm/dL    A/G Ratio 1.4 g/dL    BUN/Creatinine Ratio 29.1 (H) 7.0 - 25.0    Anion Gap 9.3 5.0 - 15.0 mmol/L   Results for orders placed or performed in visit on 01/27/21   Wound Culture - Wound, Penis    Specimen: Penis; Wound   Result Value Ref Range    Wound Culture (A)      Light growth (2+) Streptococcus agalactiae (Group B)    Wound Culture Scant growth (1+) Normal Urogenital Ashley     Gram Stain Rare (1+) Gram positive cocci in pairs     Gram Stain Rare (1+) WBCs per low power field

## 2021-04-16 ENCOUNTER — TELEPHONE (OUTPATIENT)
Dept: FAMILY MEDICINE CLINIC | Facility: CLINIC | Age: 66
End: 2021-04-16

## 2021-04-16 ENCOUNTER — OFFICE VISIT (OUTPATIENT)
Dept: FAMILY MEDICINE CLINIC | Facility: CLINIC | Age: 66
End: 2021-04-16

## 2021-04-16 ENCOUNTER — OFFICE VISIT (OUTPATIENT)
Dept: ORTHOPEDIC SURGERY | Facility: CLINIC | Age: 66
End: 2021-04-16

## 2021-04-16 VITALS
OXYGEN SATURATION: 97 % | TEMPERATURE: 97.1 F | DIASTOLIC BLOOD PRESSURE: 66 MMHG | WEIGHT: 183.8 LBS | HEART RATE: 68 BPM | BODY MASS INDEX: 27.86 KG/M2 | SYSTOLIC BLOOD PRESSURE: 110 MMHG | HEIGHT: 68 IN

## 2021-04-16 VITALS — WEIGHT: 181.9 LBS | BODY MASS INDEX: 27.57 KG/M2 | HEIGHT: 68 IN

## 2021-04-16 DIAGNOSIS — E55.9 VITAMIN D DEFICIENCY, UNSPECIFIED: ICD-10-CM

## 2021-04-16 DIAGNOSIS — E66.3 OVERWEIGHT: ICD-10-CM

## 2021-04-16 DIAGNOSIS — M25.511 RIGHT SHOULDER PAIN, UNSPECIFIED CHRONICITY: Primary | ICD-10-CM

## 2021-04-16 DIAGNOSIS — A60.00 GENITAL HERPES SIMPLEX, UNSPECIFIED SITE: Primary | ICD-10-CM

## 2021-04-16 DIAGNOSIS — E11.8 CONTROLLED TYPE 2 DIABETES MELLITUS WITH COMPLICATION, WITHOUT LONG-TERM CURRENT USE OF INSULIN (HCC): ICD-10-CM

## 2021-04-16 DIAGNOSIS — I25.10 CORONARY ARTERY DISEASE, ANGINA PRESENCE UNSPECIFIED, UNSPECIFIED VESSEL OR LESION TYPE, UNSPECIFIED WHETHER NATIVE OR TRANSPLANTED HEART: ICD-10-CM

## 2021-04-16 DIAGNOSIS — E78.2 MIXED HYPERLIPIDEMIA: ICD-10-CM

## 2021-04-16 DIAGNOSIS — M19.019 AC JOINT ARTHROPATHY: ICD-10-CM

## 2021-04-16 PROCEDURE — 99212 OFFICE O/P EST SF 10 MIN: CPT | Performed by: NURSE PRACTITIONER

## 2021-04-16 PROCEDURE — 99214 OFFICE O/P EST MOD 30 MIN: CPT | Performed by: FAMILY MEDICINE

## 2021-04-16 RX ORDER — VALACYCLOVIR HYDROCHLORIDE 1 G/1
500 TABLET, FILM COATED ORAL 3 TIMES DAILY
Qty: 21 TABLET | Refills: 0 | Status: SHIPPED | OUTPATIENT
Start: 2021-04-16 | End: 2021-04-16

## 2021-04-16 RX ORDER — VALACYCLOVIR HYDROCHLORIDE 500 MG/1
500 TABLET, FILM COATED ORAL 2 TIMES DAILY
Qty: 20 TABLET | Refills: 0 | Status: SHIPPED | OUTPATIENT
Start: 2021-04-16 | End: 2021-04-26

## 2021-04-16 NOTE — PROGRESS NOTES
"Juan Cheng is a 65 y.o. male     Chief Complaint   Patient presents with   • Right Shoulder - Follow-up       HISTORY OF PRESENT ILLNESS:    Patient is a 65-year-old male who presents today for follow-up of right shoulder pain that had been present for around 4 months at last appointment.  Patient has been treated with Mobic, Voltaren gel, activity modification, rice therapy, HEP, and subacromial injection.  Patient reports that since last being seen his range of motion has returned to baseline.  He reports that he occasionally has a slight twinge with certain movements but otherwise is doing much better.  He reports his pain is a 0.5 out of 10 today.  He denies burning, tingling, numbness.  He denies issues with ADLs.     CONCURRENT MEDICAL HISTORY:    The following portions of the patient's history were reviewed and updated as appropriate: allergies, current medications, past family history, past medical history, past social history, past surgical history and problem list.     ROS  No fevers or chills.  No chest pain or shortness of air.  No GI or  disturbances.  Right shoulder pain.    PHYSICAL EXAMINATION:       Ht 172.7 cm (68\")   Wt 82.5 kg (181 lb 14.4 oz)   BMI 27.66 kg/m²     Physical Exam  Vitals and nursing note reviewed.   Constitutional:       General: He is not in acute distress.     Appearance: He is well-developed. He is not toxic-appearing.   HENT:      Head: Normocephalic.   Pulmonary:      Effort: Pulmonary effort is normal. No respiratory distress.   Skin:     General: Skin is warm and dry.   Neurological:      Mental Status: He is alert and oriented to person, place, and time.   Psychiatric:         Behavior: Behavior normal.         Thought Content: Thought content normal.         Judgment: Judgment normal.         GAIT:     []  Normal  []  Antalgic    Assistive device: [x]  None  []  Walker     []  Crutches  []  Cane     []  Wheelchair  []  Stretcher    Right Shoulder Exam     Tenderness "   The patient is experiencing no tenderness.    Range of Motion   The patient has normal right shoulder ROM.    Tests   Jasmine test: negative  Cross arm: negative  Impingement: negative  Drop arm: negative              XR Shoulder 2+ View Right    Result Date: 3/19/2021  Narrative: Three view right shoulder HISTORY: Right shoulder pain. Chronic pain. AP films with the humerus in internal and external rotation and scapular Y view were obtained. COMPARISON: None FINDINGS: Hypertrophic change acromioclavicular joint. 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 No fracture or dislocation. No other osseous or articular abnormality. Sternotomy.     Impression: CONCLUSION: Hypertrophic change acromioclavicular joint. [id number] Electronically signed by:  Isaiah Hurley MD  3/19/2021 9:04 AM CDT Workstation: [id number]            ASSESSMENT:    Diagnoses and all orders for this visit:    Right shoulder pain, unspecified chronicity          PLAN    Normal range of motion seen today.  Patient has improved quite considerably.  Patient reports that he now only has slight twinges of pain occasionally.  Patient instructed to continue conservative measures including RICE therapy, HEP, Mobic as needed.  No further follow-up required at this time.  Patient instructed to return if symptoms recur or worsen.  Patient verbalized understanding.  I spent 10 minutes face-to-face with patient discussing plan of care and possible future need to be seen.    Patient's Body mass index is 27.66 kg/m².       Return as needed    Oma Woodson, ASTRID

## 2021-04-30 ENCOUNTER — LAB (OUTPATIENT)
Dept: LAB | Facility: HOSPITAL | Age: 66
End: 2021-04-30

## 2021-04-30 DIAGNOSIS — Z01.818 PREOP TESTING: Primary | ICD-10-CM

## 2021-04-30 LAB — SARS-COV-2 N GENE RESP QL NAA+PROBE: NOT DETECTED

## 2021-04-30 PROCEDURE — 87635 SARS-COV-2 COVID-19 AMP PRB: CPT

## 2021-04-30 PROCEDURE — C9803 HOPD COVID-19 SPEC COLLECT: HCPCS

## 2021-05-03 ENCOUNTER — ANESTHESIA (OUTPATIENT)
Dept: GASTROENTEROLOGY | Facility: HOSPITAL | Age: 66
End: 2021-05-03

## 2021-05-03 ENCOUNTER — ANESTHESIA EVENT (OUTPATIENT)
Dept: GASTROENTEROLOGY | Facility: HOSPITAL | Age: 66
End: 2021-05-03

## 2021-05-03 ENCOUNTER — HOSPITAL ENCOUNTER (OUTPATIENT)
Facility: HOSPITAL | Age: 66
Setting detail: HOSPITAL OUTPATIENT SURGERY
Discharge: HOME | End: 2021-05-03
Attending: INTERNAL MEDICINE | Admitting: INTERNAL MEDICINE

## 2021-05-03 VITALS
TEMPERATURE: 96.9 F | HEART RATE: 73 BPM | HEIGHT: 68 IN | OXYGEN SATURATION: 98 % | SYSTOLIC BLOOD PRESSURE: 104 MMHG | DIASTOLIC BLOOD PRESSURE: 67 MMHG | RESPIRATION RATE: 18 BRPM | BODY MASS INDEX: 27.25 KG/M2 | WEIGHT: 179.8 LBS

## 2021-05-03 DIAGNOSIS — Z12.11 ENCOUNTER FOR COLONOSCOPY DUE TO HISTORY OF COLONIC POLYP: ICD-10-CM

## 2021-05-03 DIAGNOSIS — Z86.010 ENCOUNTER FOR COLONOSCOPY DUE TO HISTORY OF COLONIC POLYP: ICD-10-CM

## 2021-05-03 PROCEDURE — 25010000002 PROPOFOL 10 MG/ML EMULSION: Performed by: NURSE ANESTHETIST, CERTIFIED REGISTERED

## 2021-05-03 PROCEDURE — 45385 COLONOSCOPY W/LESION REMOVAL: CPT | Performed by: INTERNAL MEDICINE

## 2021-05-03 PROCEDURE — C1889 IMPLANT/INSERT DEVICE, NOC: HCPCS | Performed by: INTERNAL MEDICINE

## 2021-05-03 PROCEDURE — 88305 TISSUE EXAM BY PATHOLOGIST: CPT

## 2021-05-03 DEVICE — DEV CLIP ENDO RESOLUTION360 CONTRL ROT 235CM: Type: IMPLANTABLE DEVICE | Site: SIGMOID COLON | Status: FUNCTIONAL

## 2021-05-03 RX ORDER — DEXTROSE AND SODIUM CHLORIDE 5; .45 G/100ML; G/100ML
30 INJECTION, SOLUTION INTRAVENOUS CONTINUOUS PRN
Status: DISCONTINUED | OUTPATIENT
Start: 2021-05-03 | End: 2021-05-03 | Stop reason: HOSPADM

## 2021-05-03 RX ORDER — PROPOFOL 10 MG/ML
VIAL (ML) INTRAVENOUS AS NEEDED
Status: DISCONTINUED | OUTPATIENT
Start: 2021-05-03 | End: 2021-05-03 | Stop reason: SURG

## 2021-05-03 RX ADMIN — PROPOFOL 100 MG: 10 INJECTION, EMULSION INTRAVENOUS at 09:49

## 2021-05-03 RX ADMIN — PROPOFOL 20 MG: 10 INJECTION, EMULSION INTRAVENOUS at 09:52

## 2021-05-03 RX ADMIN — PROPOFOL 20 MG: 10 INJECTION, EMULSION INTRAVENOUS at 09:54

## 2021-05-03 RX ADMIN — DEXTROSE AND SODIUM CHLORIDE 30 ML/HR: 5; 450 INJECTION, SOLUTION INTRAVENOUS at 08:43

## 2021-05-03 NOTE — H&P
No chief complaint on file.      Subjective    Malcolm Cheng is a 66 y.o. male. he is here today.    History of Present Illness  65-year-old male presents to discuss screening colonoscopy.  Reports prior colonoscopies were completed in [place] he had 1 polyp on initial colonoscopy and reports he had 5 polyps 3 years ago and told to repeat in 3 years for surveillance.  He has family history of colon cancer in his brother who  4 years ago with colon cancer.  Plan; schedule patient for screening colonoscopy due to personal history of colonic polyp.       The following portions of the patient's history were reviewed and updated as appropriate:   Past Medical History:   Diagnosis Date   • Diabetes mellitus (CMS/HCC)    • Hyperlipidemia      Past Surgical History:   Procedure Laterality Date   • APPENDECTOMY     • CARDIAC SURGERY     • CHOLECYSTECTOMY       Family History   Problem Relation Age of Onset   • Heart disease Father    • Alcohol abuse Father    • Cancer Brother        Prior to Admission medications    Medication Sig Start Date End Date Taking? Authorizing Provider   Acetaminophen (TYLENOL ARTHRITIS PAIN PO) Take  by mouth.   Yes Gertrudis Mcdonnell MD   aspirin 81 MG EC tablet Take 81 mg by mouth Daily.   Yes ProviderGertrudis MD   Dapagliflozin Propanediol (Farxiga) 10 MG tablet Take 10 mg by mouth Daily. 3/16/21  Yes Gage Means MD   Diclofenac Sodium (VOLTAREN) 1 % gel gel Apply 4 g topically to the appropriate area as directed 2 (Two) Times a Day. 3/5/21  Yes Gage Means MD   icosapent ethyl (Vascepa) 1 g capsule capsule Take 2 g by mouth 2 (Two) Times a Day With Meals. 3/5/21  Yes Gage Means MD   meloxicam (Mobic) 15 MG tablet Take 1 tablet by mouth Daily for 14 days. 3/25/21 4/8/21 Yes Oma Woodson APRN   metFORMIN (GLUCOPHAGE) 1000 MG tablet  20  Yes Gertrudis Mcdonnell MD   metoprolol succinate XL (TOPROL-XL) 25 MG 24 hr tablet  20  Yes Kecia  "MD Gertrudis   Omega-3 Fatty Acids (fish oil) 1000 MG capsule capsule Take 1,000 mg by mouth.   Yes ProviderGertrudis MD   rosuvastatin (CRESTOR) 10 MG tablet  21  Yes Gertrudis Mcdonnell MD   vitamin B-12 (CYANOCOBALAMIN) 500 MCG tablet Take 1 tablet by mouth Daily. 3/5/21  Yes Gage Means MD   vitamin D (ERGOCALCIFEROL) 1.25 MG (57768 UT) capsule capsule Take 1 capsule by mouth Every 7 (Seven) Days. 21  Yes Gage Means MD   Omega-3 Fatty Acids (fish oil) 1000 MG capsule capsule Omega 3  21  ProviderGertrudis MD     No Known Allergies  Social History     Socioeconomic History   • Marital status:      Spouse name: Not on file   • Number of children: Not on file   • Years of education: Not on file   • Highest education level: Not on file   Tobacco Use   • Smoking status: Former Smoker     Quit date:      Years since quittin.3   • Smokeless tobacco: Never Used   Substance and Sexual Activity   • Alcohol use: Not Currently   • Drug use: Never   • Sexual activity: Defer       Review of Systems  Review of Systems   Constitutional: Negative for activity change, appetite change, chills, diaphoresis, fatigue, fever and unexpected weight change.   Respiratory: Negative for cough and shortness of breath.    Gastrointestinal: Positive for constipation. Negative for abdominal distention, abdominal pain, anal bleeding, blood in stool, diarrhea, nausea, rectal pain and vomiting.        /91 (BP Location: Left arm, Patient Position: Lying)   Pulse 102   Temp 97.2 °F (36.2 °C) (Temporal)   Resp 18   Ht 172.7 cm (68\")   Wt 81.6 kg (179 lb 12.8 oz)   SpO2 97%   BMI 27.34 kg/m²     Objective    Physical Exam  Constitutional:       Appearance: Normal appearance.   Pulmonary:      Effort: Pulmonary effort is normal.   Abdominal:      General: Bowel sounds are normal. There is no distension.      Palpations: Abdomen is soft.      Tenderness: There is no abdominal tenderness. "   Neurological:      Mental Status: He is alert.       Lab on 01/28/2021   Component Date Value Ref Range Status   • WBC 01/28/2021 10.21  3.40 - 10.80 10*3/mm3 Final   • RBC 01/28/2021 5.43  4.14 - 5.80 10*6/mm3 Final   • Hemoglobin 01/28/2021 16.4  13.0 - 17.7 g/dL Final   • Hematocrit 01/28/2021 47.7  37.5 - 51.0 % Final   • MCV 01/28/2021 87.8  79.0 - 97.0 fL Final   • MCH 01/28/2021 30.2  26.6 - 33.0 pg Final   • MCHC 01/28/2021 34.4  31.5 - 35.7 g/dL Final   • RDW 01/28/2021 12.9  12.3 - 15.4 % Final   • RDW-SD 01/28/2021 40.0  37.0 - 54.0 fl Final   • MPV 01/28/2021 10.1  6.0 - 12.0 fL Final   • Platelets 01/28/2021 276  140 - 450 10*3/mm3 Final   • Neutrophil % 01/28/2021 44.5  42.7 - 76.0 % Final   • Lymphocyte % 01/28/2021 46.1* 19.6 - 45.3 % Final   • Monocyte % 01/28/2021 5.9  5.0 - 12.0 % Final   • Eosinophil % 01/28/2021 1.9  0.3 - 6.2 % Final   • Basophil % 01/28/2021 1.2  0.0 - 1.5 % Final   • Immature Grans % 01/28/2021 0.4  0.0 - 0.5 % Final   • Neutrophils, Absolute 01/28/2021 4.55  1.70 - 7.00 10*3/mm3 Final   • Lymphocytes, Absolute 01/28/2021 4.71* 0.70 - 3.10 10*3/mm3 Final   • Monocytes, Absolute 01/28/2021 0.60  0.10 - 0.90 10*3/mm3 Final   • Eosinophils, Absolute 01/28/2021 0.19  0.00 - 0.40 10*3/mm3 Final   • Basophils, Absolute 01/28/2021 0.12  0.00 - 0.20 10*3/mm3 Final   • Immature Grans, Absolute 01/28/2021 0.04  0.00 - 0.05 10*3/mm3 Final   • nRBC 01/28/2021 0.0  0.0 - 0.2 /100 WBC Final   • Glucose 01/28/2021 119* 65 - 99 mg/dL Final   • BUN 01/28/2021 23  8 - 23 mg/dL Final   • Creatinine 01/28/2021 0.79  0.76 - 1.27 mg/dL Final   • Sodium 01/28/2021 137  136 - 145 mmol/L Final   • Potassium 01/28/2021 4.6  3.5 - 5.2 mmol/L Final   • Chloride 01/28/2021 100  98 - 107 mmol/L Final   • CO2 01/28/2021 27.7  22.0 - 29.0 mmol/L Final   • Calcium 01/28/2021 10.2  8.6 - 10.5 mg/dL Final   • Total Protein 01/28/2021 8.0  6.0 - 8.5 g/dL Final   • Albumin 01/28/2021 4.60  3.50 - 5.20 g/dL  Final   • ALT (SGPT) 01/28/2021 51* 1 - 41 U/L Final   • AST (SGOT) 01/28/2021 27  1 - 40 U/L Final   • Alkaline Phosphatase 01/28/2021 79  39 - 117 U/L Final   • Total Bilirubin 01/28/2021 0.4  0.0 - 1.2 mg/dL Final   • eGFR Non African Amer 01/28/2021 98  >60 mL/min/1.73 Final   • Globulin 01/28/2021 3.4  gm/dL Final   • A/G Ratio 01/28/2021 1.4  g/dL Final   • BUN/Creatinine Ratio 01/28/2021 29.1* 7.0 - 25.0 Final   • Anion Gap 01/28/2021 9.3  5.0 - 15.0 mmol/L Final   • Hemoglobin A1C 01/28/2021 6.60* 4.80 - 5.60 % Final   • Total Cholesterol 01/28/2021 106  0 - 200 mg/dL Final   • Triglycerides 01/28/2021 194* 0 - 150 mg/dL Final   • HDL Cholesterol 01/28/2021 35* 40 - 60 mg/dL Final   • LDL Cholesterol  01/28/2021 40  0 - 100 mg/dL Final   • VLDL Cholesterol 01/28/2021 31  5 - 40 mg/dL Final   • LDL/HDL Ratio 01/28/2021 0.92   Final   • TSH 01/28/2021 1.200  0.270 - 4.200 uIU/mL Final   • Free T4 01/28/2021 1.35  0.93 - 1.70 ng/dL Final   • 25 Hydroxy, Vitamin D 01/28/2021 22.5* 30.0 - 100.0 ng/ml Final   • Vitamin B-12 01/28/2021 281  211 - 946 pg/mL Final   • Microalbumin/Creatinine Ratio 01/28/2021    Final    Unable to calculate   • Creatinine, Urine 01/28/2021 71.6  mg/dL Final   • Microalbumin, Urine 01/28/2021 <1.2  mg/dL Final   • Hepatitis C Ab 01/28/2021 Non-Reactive  Non-Reactive Final   • Urine Culture 01/28/2021 No growth   Final   • HSV 1 IgM 01/28/2021 1:100* <1:10 titer Final   • HSV 2 IgM 01/28/2021 1:1000* <1:10 titer Final    HSV 1 and HSV 2 share many cross-reacting antigens. Elevated titers  to both HSV 1 and HSV 2 may represent crossreactive HSV antibodies  rather than exposure to both HSV 1 and HSV 2.  Results for this test are for research purposes only by the assay's  . The performance characteristics of this product have  not been established.  Results should not be used as a diagnostic  procedure without confirmation of the diagnosis by another medically  established  diagnostic product or procedure.   • HSV 1 IgG, Type Specific 01/28/2021 1.57* 0.00 - 0.90 index Final                                     Negative        <0.91                                   Equivocal 0.91 - 1.09                                   Positive        >1.09   Note: Negative indicates no antibodies detected to   HSV-1. Equivocal may suggest early infection.  If   clinically appropriate, retest at later date. Positive   indicates antibodies detected to HSV-1.   • HSV 2 IgG 01/28/2021 2.03* 0.00 - 0.90 index Final                                     Negative        <0.91                                   Equivocal 0.91 - 1.09                                   Positive        >1.09   Note: Negative indicates no antibodies detected to   HSV-2. Equivocal may suggest early infection.  If   clinically appropriate, retest at later date. Positive   indicates antibodies detected to HSV-2.   • RPR 01/28/2021 Non-Reactive  Non-Reactive Final   • HIV-1/ HIV-2 01/28/2021 Non-Reactive  Non-Reactive Final    A non-reactive test result does not preclude the possibility of exposure to HIV or infection with HIV. An antibody response to recent exposure may take several months to reach detectable levels.   • Color, UA 01/28/2021 Yellow  Yellow, Straw Final   • Appearance, UA 01/28/2021 Clear  Clear Final   • pH, UA 01/28/2021 6.0  5.0 - 8.0 Final   • Specific Gravity, UA 01/28/2021 >=1.030  1.005 - 1.030 Final   • Glucose, UA 01/28/2021 >=1000 mg/dL (3+)* Negative Final   • Ketones, UA 01/28/2021 Trace* Negative Final   • Bilirubin, UA 01/28/2021 Negative  Negative Final   • Blood, UA 01/28/2021 Negative  Negative Final   • Protein, UA 01/28/2021 Negative  Negative Final   • Leuk Esterase, UA 01/28/2021 Negative  Negative Final   • Nitrite, UA 01/28/2021 Negative  Negative Final   • Urobilinogen, UA 01/28/2021 0.2 E.U./dL  0.2 - 1.0 E.U./dL Final   • RBC, UA 01/28/2021 0-2  None Seen, 0-2 /HPF Final   • WBC, UA 01/28/2021  0-2  None Seen, 0-2 /HPF Final   • Bacteria,  01/28/2021 None Seen  None Seen /HPF Final   • Squamous Epithelial Cells,  01/28/2021 0-2  None Seen, 0-2 /HPF Final   • Hyaline Casts,  01/28/2021 None Seen  None Seen /LPF Final   • Methodology 01/28/2021 Automated Microscopy   Final   • HSV-2 IgG Supplemental Test 01/28/2021 Positive* Negative Final     HSV-2 IgG          HSV-2 IgG  Type Specific      Confirmation      Interpretation  -------------------------------------------------------  Positive/Equivocal   Positive    Indicates the presence                                   of detectable IgG                                   antibodies to HSV-2.  -------------------------------------------------------  Positive/Equivocal   Negative    Unable to confirm the                                   presence of IgG                                   antibodies to HSV-2.                                   Recommend retesting                                   in 2-4 weeks.  -------------------------------------------------------     Assessment/Plan      1. Encounter for colonoscopy due to history of colonic polyp    .       Orders placed during this encounter include:  Orders Placed This Encounter   Procedures   • Obtain Informed Consent     Standing Status:   Standing     Number of Occurrences:   1     Order Specific Question:   Informed Consent Given For     Answer:   Colonoscopy   • POC Glucose Once     Prior to Procedure on ALL Diabetic Patients     Standing Status:   Standing     Number of Occurrences:   1   • Insert Peripheral IV     Standing Status:   Standing     Number of Occurrences:   1       COLONOSCOPY a month out (N/A)    Review and/or summary of lab tests, radiology, procedures, medications. Review and summary of old records and obtaining of history. The risks and benefits of my recommendations, as well as other treatment options were discussed with the patient today. Questions were answered.    New  Medications Ordered This Visit   Medications   • dextrose 5 % and sodium chloride 0.45 % infusion       Follow-up: No follow-ups on file.          This document has been electronically signed by Hector Dennison MD on May 3, 2021 09:45 CDT               Results for orders placed or performed in visit on 04/30/21   COVID-19, [facility] IN-HOUSE, NP SWAB IN TRANSPORT MEDIA 8-10 HR TAT - Swab, Nasopharynx    Specimen: Nasopharynx; Swab   Result Value Ref Range    COVID19 Not Detected Not Detected - Ref. Range   Results for orders placed or performed in visit on 01/28/21   HSV-2 IgG Supplemental Test    Specimen: Blood   Result Value Ref Range    HSV-2 IgG Supplemental Test Positive (A) Negative   Urinalysis, Microscopic Only - Urine, Clean Catch    Specimen: Urine, Clean Catch   Result Value Ref Range    RBC, UA 0-2 None Seen, 0-2 /HPF    WBC, UA 0-2 None Seen, 0-2 /HPF    Bacteria, UA None Seen None Seen /HPF    Squamous Epithelial Cells, UA 0-2 None Seen, 0-2 /HPF    Hyaline Casts, UA None Seen None Seen /LPF    Methodology Automated Microscopy    HIV-1 / O / 2 Ag / Antibody 4th Generation    Specimen: Blood   Result Value Ref Range    HIV-1/ HIV-2 Non-Reactive Non-Reactive   HSV 1 and 2 IgM, Abs, Indirect    Specimen: Blood   Result Value Ref Range    HSV 1 IgM 1:100 (H) <1:10 titer    HSV 2 IgM 1:1000 (H) <1:10 titer   HSV 1 and 2-Specific Ab, IgG    Specimen: Blood   Result Value Ref Range    HSV 1 IgG, Type Specific 1.57 (H) 0.00 - 0.90 index    HSV 2 IgG 2.03 (H) 0.00 - 0.90 index   CBC Auto Differential    Specimen: Blood   Result Value Ref Range    WBC 10.21 3.40 - 10.80 10*3/mm3    RBC 5.43 4.14 - 5.80 10*6/mm3    Hemoglobin 16.4 13.0 - 17.7 g/dL    Hematocrit 47.7 37.5 - 51.0 %    MCV 87.8 79.0 - 97.0 fL    MCH 30.2 26.6 - 33.0 pg    MCHC 34.4 31.5 - 35.7 g/dL    RDW 12.9 12.3 - 15.4 %    RDW-SD 40.0 37.0 - 54.0 fl    MPV 10.1 6.0 - 12.0 fL    Platelets 276 140 - 450 10*3/mm3    Neutrophil % 44.5 42.7 - 76.0 %     Lymphocyte % 46.1 (H) 19.6 - 45.3 %    Monocyte % 5.9 5.0 - 12.0 %    Eosinophil % 1.9 0.3 - 6.2 %    Basophil % 1.2 0.0 - 1.5 %    Immature Grans % 0.4 0.0 - 0.5 %    Neutrophils, Absolute 4.55 1.70 - 7.00 10*3/mm3    Lymphocytes, Absolute 4.71 (H) 0.70 - 3.10 10*3/mm3    Monocytes, Absolute 0.60 0.10 - 0.90 10*3/mm3    Eosinophils, Absolute 0.19 0.00 - 0.40 10*3/mm3    Basophils, Absolute 0.12 0.00 - 0.20 10*3/mm3    Immature Grans, Absolute 0.04 0.00 - 0.05 10*3/mm3    nRBC 0.0 0.0 - 0.2 /100 WBC   Hepatitis C Antibody    Specimen: Blood   Result Value Ref Range    Hepatitis C Ab Non-Reactive Non-Reactive   Microalbumin / Creatinine Urine Ratio - Urine, Clean Catch    Specimen: Urine, Clean Catch   Result Value Ref Range    Microalbumin/Creatinine Ratio      Creatinine, Urine 71.6 mg/dL    Microalbumin, Urine <1.2 mg/dL   Vitamin D 25 Hydroxy    Specimen: Blood   Result Value Ref Range    25 Hydroxy, Vitamin D 22.5 (L) 30.0 - 100.0 ng/ml   RPR    Specimen: Blood   Result Value Ref Range    RPR Non-Reactive Non-Reactive   Urinalysis without microscopic (no culture) - Urine, Clean Catch    Specimen: Urine, Clean Catch   Result Value Ref Range    Color, UA Yellow Yellow, Straw    Appearance, UA Clear Clear    pH, UA 6.0 5.0 - 8.0    Specific Gravity, UA >=1.030 1.005 - 1.030    Glucose, UA >=1000 mg/dL (3+) (A) Negative    Ketones, UA Trace (A) Negative    Bilirubin, UA Negative Negative    Blood, UA Negative Negative    Protein, UA Negative Negative    Leuk Esterase, UA Negative Negative    Nitrite, UA Negative Negative    Urobilinogen, UA 0.2 E.U./dL 0.2 - 1.0 E.U./dL   Urine Culture - Urine, Urine, Clean Catch    Specimen: Urine, Clean Catch   Result Value Ref Range    Urine Culture No growth    TSH    Specimen: Blood   Result Value Ref Range    TSH 1.200 0.270 - 4.200 uIU/mL   T4, Free    Specimen: Blood   Result Value Ref Range    Free T4 1.35 0.93 - 1.70 ng/dL   Hemoglobin A1c    Specimen: Blood   Result  Value Ref Range    Hemoglobin A1C 6.60 (H) 4.80 - 5.60 %   Vitamin B12    Specimen: Blood   Result Value Ref Range    Vitamin B-12 281 211 - 946 pg/mL   Lipid Panel    Specimen: Blood   Result Value Ref Range    Total Cholesterol 106 0 - 200 mg/dL    Triglycerides 194 (H) 0 - 150 mg/dL    HDL Cholesterol 35 (L) 40 - 60 mg/dL    LDL Cholesterol  40 0 - 100 mg/dL    VLDL Cholesterol 31 5 - 40 mg/dL    LDL/HDL Ratio 0.92    Comprehensive Metabolic Panel    Specimen: Blood   Result Value Ref Range    Glucose 119 (H) 65 - 99 mg/dL    BUN 23 8 - 23 mg/dL    Creatinine 0.79 0.76 - 1.27 mg/dL    Sodium 137 136 - 145 mmol/L    Potassium 4.6 3.5 - 5.2 mmol/L    Chloride 100 98 - 107 mmol/L    CO2 27.7 22.0 - 29.0 mmol/L    Calcium 10.2 8.6 - 10.5 mg/dL    Total Protein 8.0 6.0 - 8.5 g/dL    Albumin 4.60 3.50 - 5.20 g/dL    ALT (SGPT) 51 (H) 1 - 41 U/L    AST (SGOT) 27 1 - 40 U/L    Alkaline Phosphatase 79 39 - 117 U/L    Total Bilirubin 0.4 0.0 - 1.2 mg/dL    eGFR Non African Amer 98 >60 mL/min/1.73    Globulin 3.4 gm/dL    A/G Ratio 1.4 g/dL    BUN/Creatinine Ratio 29.1 (H) 7.0 - 25.0    Anion Gap 9.3 5.0 - 15.0 mmol/L   Results for orders placed or performed in visit on 01/27/21   Wound Culture - Wound, Penis    Specimen: Penis; Wound   Result Value Ref Range    Wound Culture (A)      Light growth (2+) Streptococcus agalactiae (Group B)    Wound Culture Scant growth (1+) Normal Urogenital Ashley     Gram Stain Rare (1+) Gram positive cocci in pairs     Gram Stain Rare (1+) WBCs per low power field

## 2021-05-03 NOTE — ANESTHESIA POSTPROCEDURE EVALUATION
Patient: Malcolm Cheng    Procedure Summary     Date: 05/03/21 Room / Location: [facility] ENDOSCOPY 1 / [facility] ENDOSCOPY    Anesthesia Start: 0949 Anesthesia Stop: 1007    Procedure: COLONOSCOPY a month out (N/A ) Diagnosis:       Encounter for colonoscopy due to history of colonic polyp      (Encounter for colonoscopy due to history of colonic polyp [Z12.11, Z86.010])    Surgeons: Hector Dennison MD Provider: Rah Kemp CRNA    Anesthesia Type: MAC ASA Status: 3          Anesthesia Type: MAC    Vitals  No vitals data found for the desired time range.          Post Anesthesia Care and Evaluation    Patient location during evaluation: bedside  Patient participation: waiting for patient participation  Level of consciousness: responsive to verbal stimuli  Pain management: adequate  Airway patency: patent  Anesthetic complications: No anesthetic complications  PONV Status: none  Cardiovascular status: acceptable  Respiratory status: acceptable  Hydration status: acceptable    Comments: ---------------------------               05/03/21                     1008         ---------------------------   BP:         93/56         Pulse:         72        Resp:          18           Temp:   97.2 °F (36.2 °C)   SpO2:          97%         ---------------------------

## 2021-05-03 NOTE — ANESTHESIA PREPROCEDURE EVALUATION
Anesthesia Evaluation     NPO Solid Status: > 8 hours  NPO Liquid Status: > 2 hours           Airway   Mallampati: II  TM distance: >3 FB  Neck ROM: full  no difficulty expected  Dental    (+) partials    Pulmonary - normal exam   Cardiovascular - normal exam    (+) CAD, hyperlipidemia,       Neuro/Psych  GI/Hepatic/Renal/Endo    (+)   diabetes mellitus,     Musculoskeletal     Abdominal    Substance History      OB/GYN          Other                        Anesthesia Plan    ASA 3     MAC     intravenous induction     Anesthetic plan, all risks, benefits, and alternatives have been provided, discussed and informed consent has been obtained with: patient.

## 2021-05-05 LAB
LAB AP CASE REPORT: NORMAL
PATH REPORT.FINAL DX SPEC: NORMAL

## 2021-05-11 ENCOUNTER — OFFICE VISIT (OUTPATIENT)
Dept: GASTROENTEROLOGY | Facility: CLINIC | Age: 66
End: 2021-05-11

## 2021-05-11 VITALS
SYSTOLIC BLOOD PRESSURE: 157 MMHG | BODY MASS INDEX: 27.46 KG/M2 | DIASTOLIC BLOOD PRESSURE: 94 MMHG | HEIGHT: 68 IN | HEART RATE: 87 BPM | WEIGHT: 181.2 LBS

## 2021-05-11 DIAGNOSIS — D12.5 ADENOMATOUS POLYP OF SIGMOID COLON: Primary | ICD-10-CM

## 2021-05-11 PROCEDURE — 99211 OFF/OP EST MAY X REQ PHY/QHP: CPT | Performed by: NURSE PRACTITIONER

## 2021-05-11 NOTE — PROGRESS NOTES
66-year-old male presents for follow-up after colonoscopy.  Reports he is already reviewed results in my chart and has no questions.  Denies any abdominal pain nausea vomiting or changes in his bowel habits.  Denies any melena or hematochezia.   Vitals:    05/11/21 1011   BP: 157/94   Pulse: 87   weight 181 pounds     Colonoscopy 5/3/2021 noted normal perianal digital rectal exam small polyp was found and removed from the sigmoid colon clip was placed to close defect after polypectomy there was no bleeding at the end of the procedure.  Repeat recommended in 3 years for surveillance polyp was found to be adenomatous measuring 0.7 x 0.4 x 0.3.   Diagnosis Plan   1. Adenomatous polyp of sigmoid colon         Plan; follow-up in GI office in 3 years for repeat colonoscopy sooner if  needed.

## 2021-05-11 NOTE — PATIENT INSTRUCTIONS
Colon Polyps    Polyps are tissue growths inside the body. Polyps can grow in many places, including the large intestine (colon). A polyp may be a round bump or a mushroom-shaped growth. You could have one polyp or several.  Most colon polyps are noncancerous (benign). However, some colon polyps can become cancerous over time. Finding and removing the polyps early can help prevent this.  What are the causes?  The exact cause of colon polyps is not known.  What increases the risk?  You are more likely to develop this condition if you:  · Have a family history of colon cancer or colon polyps.  · Are older than 50 or older than 45 if you are .  · Have inflammatory bowel disease, such as ulcerative colitis or Crohn's disease.  · Have certain hereditary conditions, such as:  ? Familial adenomatous polyposis.  ? Isbell syndrome.  ? Turcot syndrome.  ? Peutz-Jeghers syndrome.  · Are overweight.  · Smoke cigarettes.  · Do not get enough exercise.  · Drink too much alcohol.  · Eat a diet that is high in fat and red meat and low in fiber.  · Had childhood cancer that was treated with abdominal radiation.  What are the signs or symptoms?  Most polyps do not cause symptoms.  If you have symptoms, they may include:  · Blood coming from your rectum when having a bowel movement.  · Blood in your stool. The stool may look dark red or black.  · Abdominal pain.  · A change in bowel habits, such as constipation or diarrhea.  How is this diagnosed?  This condition is diagnosed with a colonoscopy. This is a procedure in which a lighted, flexible scope is inserted into the anus and then passed into the colon to examine the area. Polyps are sometimes found when a colonoscopy is done as part of routine cancer screening tests.  How is this treated?  Treatment for this condition involves removing any polyps that are found. Most polyps can be removed during a colonoscopy. Those polyps will then be tested for cancer. Additional  treatment may be needed depending on the results of testing.  Follow these instructions at home:  Lifestyle  · Maintain a healthy weight, or lose weight if recommended by your health care provider.  · Exercise every day or as told by your health care provider.  · Do not use any products that contain nicotine or tobacco, such as cigarettes and e-cigarettes. If you need help quitting, ask your health care provider.  · If you drink alcohol, limit how much you have:  ? 0-1 drink a day for women.  ? 0-2 drinks a day for men.  · Be aware of how much alcohol is in your drink. In the [place] one drink equals one 12 oz bottle of beer (355 mL), one 5 oz glass of wine (148 mL), or one 1½ oz shot of hard liquor (44 mL).  Eating and drinking    · Eat foods that are high in fiber, such as fruits, vegetables, and whole grains.  · Eat foods that are high in calcium and vitamin D, such as milk, cheese, yogurt, eggs, liver, fish, and broccoli.  · Limit foods that are high in fat, such as fried foods and desserts.  · Limit the amount of red meat and processed meat you eat, such as hot dogs, sausage, cardenas, and lunch meats.  General instructions  · Keep all follow-up visits as told by your health care provider. This is important.  ? This includes having regularly scheduled colonoscopies.  ? Talk to your health care provider about when you need a colonoscopy.  Contact a health care provider if:  · You have new or worsening bleeding during a bowel movement.  · You have new or increased blood in your stool.  · You have a change in bowel habits.  · You lose weight for no known reason.  Summary  · Polyps are tissue growths inside the body. Polyps can grow in many places, including the colon.  · Most colon polyps are noncancerous (benign), but some can become cancerous over time.  · This condition is diagnosed with a colonoscopy.  · Treatment for this condition involves removing any polyps that are found. Most polyps can be removed during a  colonoscopy.  This information is not intended to replace advice given to you by your health care provider. Make sure you discuss any questions you have with your health care provider.  Document Revised: 04/04/2019 Document Reviewed: 04/04/2019  Elsevier Patient Education © 2021 Elsevier Inc.

## 2021-06-09 ENCOUNTER — TELEPHONE (OUTPATIENT)
Dept: FAMILY MEDICINE CLINIC | Facility: CLINIC | Age: 66
End: 2021-06-09

## 2021-09-02 ENCOUNTER — OFFICE VISIT (OUTPATIENT)
Dept: FAMILY MEDICINE CLINIC | Facility: CLINIC | Age: 66
End: 2021-09-02

## 2021-09-02 VITALS
OXYGEN SATURATION: 98 % | BODY MASS INDEX: 28.73 KG/M2 | TEMPERATURE: 97.7 F | WEIGHT: 189.6 LBS | DIASTOLIC BLOOD PRESSURE: 70 MMHG | HEIGHT: 68 IN | RESPIRATION RATE: 20 BRPM | SYSTOLIC BLOOD PRESSURE: 122 MMHG | HEART RATE: 71 BPM

## 2021-09-02 DIAGNOSIS — A60.00 RECURRENT GENITAL HERPES SIMPLEX: Primary | ICD-10-CM

## 2021-09-02 PROCEDURE — 99213 OFFICE O/P EST LOW 20 MIN: CPT | Performed by: NURSE PRACTITIONER

## 2021-09-02 RX ORDER — ICOSAPENT ETHYL 1000 MG/1
CAPSULE ORAL
COMMUNITY
End: 2022-08-30

## 2021-09-02 RX ORDER — VALACYCLOVIR HYDROCHLORIDE 500 MG/1
500 TABLET, FILM COATED ORAL DAILY
Qty: 90 TABLET | Refills: 1 | Status: SHIPPED | OUTPATIENT
Start: 2021-09-02 | End: 2022-02-16

## 2021-09-02 RX ORDER — VALACYCLOVIR HYDROCHLORIDE 500 MG/1
TABLET, FILM COATED ORAL
COMMUNITY
End: 2021-09-02

## 2021-09-02 RX ORDER — MELOXICAM 15 MG/1
TABLET ORAL
COMMUNITY
End: 2022-08-30

## 2021-09-02 NOTE — PATIENT INSTRUCTIONS
"[email or web address]/infectious-[email or web address]/[email or web address]-[email or web address]">   Genital Herpes  Genital herpes is a common sexually transmitted infection (STI) that is caused by a virus. The virus spreads from person to person through sexual contact. The infection can cause itching, blisters, and sores around the genitals or rectum. Symptoms may last for several days and then go away. This is called an outbreak. The virus remains in the body, however, so more outbreaks may happen in the future. The time between outbreaks varies and can be from months to years.  Genital herpes can affect anyone. It is particularly concerning for pregnant women because the virus can be passed to the baby during delivery and can cause serious problems. Genital herpes is also a concern for people who have a weak disease-fighting system (immune system).  What are the causes?  This condition is caused by the human herpesvirus, also called herpes simplex virus, type 1 or type 2 (HSV-1 or HSV-2). The virus may spread through:  · Sexual contact with an infected person, including vaginal, anal, and oral sex.  · Contact with fluid from a herpes sore.  · The skin. This means that you can get herpes from an infected partner even if there are no blisters or sores present. Your partner may not know that he or she is infected.  What increases the risk?  You are more likely to develop this condition if:  · You have sex with many partners.  · You do not use latex condoms during sex.  What are the signs or symptoms?  Most people do not have symptoms (are asymptomatic), or they have mild symptoms that may be mistaken for other skin problems. Symptoms may include:  · Small, red bumps near the genitals, rectum, or mouth. These bumps turn into blisters and then sores.  · Flu-like symptoms, including:  ? Fever.  ? Body aches.  ? Swollen lymph nodes.  ? Headache.  · Painful urination.  · Pain and itching in the genital area or rectal " area.  · Vaginal discharge.  · Tingling or shooting pain in the legs and buttocks.  Generally, symptoms are more severe and last longer during the first (primary) outbreak. Flu-like symptoms are also more common during the primary outbreak.  How is this diagnosed?  This condition may be diagnosed based on:  · A physical exam.  · Your medical history.  · Blood tests.  · A test of a fluid sample (culture) from an open sore.  How is this treated?  There is no cure for this condition, but treatment with antiviral medicines that are taken by mouth (orally) can do the following:  · Speed up healing and relieve symptoms.  · Help to reduce the spread of the virus to sexual partners.  · Limit the chance of future outbreaks, or make future outbreaks shorter.  · Lessen symptoms of future outbreaks.  Your health care provider may also recommend pain relief medicines, such as aspirin or ibuprofen.  Follow these instructions at home:  If you have an outbreak:  · Keep the affected areas dry and clean.  · Avoid rubbing or touching blisters and sores. If you do touch blisters or sores:  ? Wash your hands thoroughly with soap and water.  ? Do not touch your eyes afterward.  · To help relieve pain or itching, you may take the following actions as told by your health care provider:  ? Apply a cold, wet cloth (cold compress) to affected areas 4-6 times a day.  ? Apply a substance that protects your skin and reduces bleeding (astringent).  ? Apply a gel that helps relieve pain around sores (lidocaine gel).  ? Take a warm, shallow bath that cleans the genital area (sitz bath).  Sexual activity  · Do not have sexual contact during active outbreaks.  · Practice safe sex. Herpes can spread even if your partner does not have blisters or sores. Latex condoms and female condoms may help prevent the spread of the herpes virus.  General instructions  · Take over-the-counter and prescription medicines only as told by your health care  provider.  · Keep all follow-up visits as told by your health care provider. This is important.  How is this prevented?  · Use condoms. Although you can get genital herpes during sexual contact even with the use of a condom, a condom can provide some protection.  · Avoid having multiple sexual partners.  · Talk with your sexual partner about any symptoms either of you may have. Also, talk with your partner about any history of STIs.  · Get tested for STIs before you have sex. Ask your partner to do the same.  · Do not have sexual contact if you have active symptoms of genital herpes.  Contact a health care provider if:  · Your symptoms are not improving with medicine.  · Your symptoms return, or you have new symptoms.  · You have a fever.  · You have abdominal pain.  · You have redness, swelling, or pain in your eye.  · You notice new sores on other parts of your body.  · You are a woman and you experience bleeding between menstrual periods.  · You have had herpes and you become pregnant or plan to become pregnant.  Summary  · Genital herpes is a common sexually transmitted infection (STI) that is caused by the herpes simplex virus, type 1 or type 2 (HSV-1 or HSV-2).  · These viruses are most often spread through sexual contact with an infected person.  · You are more likely to develop this condition if you have sex with many partners or you do not use condoms during sex.  · Most people do not have symptoms (are asymptomatic) or have mild symptoms that may be mistaken for other skin problems. Symptoms occur as outbreaks that may happen months or years apart.  · There is no cure for this condition, but treatment with oral antiviral medicines can reduce symptoms, reduce the chance of spreading the virus to a partner, prevent future outbreaks, or shorten future outbreaks.  This information is not intended to replace advice given to you by your health care provider. Make sure you discuss any questions you have with your  health care provider.  Document Revised: 08/27/2020 Document Reviewed: 08/27/2020  Elsevier Patient Education © 2021 Elsevier Inc.

## 2021-09-02 NOTE — PROGRESS NOTES
"Chief Complaint  Rash (x 1 week)    Subjective          Malcolm Cheng presents to [facility] - [facility]    PCP: Dr. Means    CC: \"rash\"    Reports he has been having increased personal stress in the last 6 months and has had recurrent outbreaks.  Reports he doesn't believe his stress will improve for at least another 6+ months.     Rash  This is a recurrent problem. Episode onset: reports genital rash 30 years ago that resolved, but then started having recurrent blisters/rashes in the last years--HSV testing + for HSV1 and 2.  Most frequent outbreak was a week ago. The problem has been gradually improving since onset. Location: upper right thigh. The rash is characterized by blistering. Associated with: Hx of HSV. Pertinent negatives include no anorexia, congestion, cough, diarrhea, eye pain, facial edema, fatigue, fever, joint pain, nail changes, rhinorrhea, shortness of breath, sore throat or vomiting. Treatments tried: ketoconazole ointment; has been treated a few times this year with Valtrex, but has not been put on suppressive therapy. Improvement on treatment: area is drying up.       Review of Systems   Constitutional: Negative.  Negative for fatigue and fever.   HENT: Negative.  Negative for congestion, rhinorrhea and sore throat.    Eyes: Negative.  Negative for pain.   Respiratory: Negative.  Negative for cough and shortness of breath.    Cardiovascular: Negative.    Gastrointestinal: Negative.  Negative for anorexia, diarrhea and vomiting.   Musculoskeletal: Negative.  Negative for joint pain.   Skin: Positive for rash. Negative for nail changes.   Neurological: Negative.    Psychiatric/Behavioral:        +increased personal stress        Objective   Vital Signs:   /70 (BP Location: Left arm, Patient Position: Sitting, Cuff Size: Adult)   Pulse 71   Temp 97.7 °F (36.5 °C) (Temporal)   Resp 20   Ht 172.7 cm (68\")  "  Wt 86 kg (189 lb 9.6 oz)   SpO2 98%   BMI 28.83 kg/m²       Physical Exam  Vitals and nursing note reviewed.   Constitutional:       General: He is not in acute distress.     Appearance: Normal appearance. He is not ill-appearing.   HENT:      Head: Normocephalic and atraumatic.   Cardiovascular:      Rate and Rhythm: Normal rate and regular rhythm.   Pulmonary:      Effort: Pulmonary effort is normal. No respiratory distress.      Breath sounds: Normal breath sounds. No wheezing, rhonchi or rales.   Musculoskeletal:      Cervical back: Neck supple.   Skin:     General: Skin is warm.      Findings: Rash ( rash to upper right thigh near groin that is crusted over/drying up.  Mild redness noted from where bandaid was removed) present.          Neurological:      General: No focal deficit present.      Mental Status: He is alert and oriented to person, place, and time.   Psychiatric:         Mood and Affect: Mood normal.         Thought Content: Thought content normal.          Result Review :     Renal Profile    Renal Profile 1/28/21   BUN 23   Creatinine 0.79   eGFR Non  Am 98           Component   Ref Range & Units 7 mo ago   HSV-2 IgG Supplemental Test   Negative PositiveAbnormal     Comment:  HSV-2 IgG          HSV-2 IgG   Type Specific      Confirmation      Interpretation   -------------------------------------------------------   Positive/Equivocal   Positive    Indicates the presence                                    of detectable IgG                                    antibodies to HSV-2.   -------------------------------------------------------   Positive/Equivocal   Negative    Unable to confirm the                                    presence of IgG                                    antibodies to HSV-2.                                    Recommend retesting                                    in 2-4 weeks.   -------------------------------------------------------   [facility]       Narrative  Performed by: [facility]  Performed at:  [address] [facility] [address]  [id number]   : Giovanni Chicas PhD, Phone:  [phone]      Specimen Collected: 01/28/21 08:21 Last Resulted: 01/29/21 12:08                    Assessment and Plan    Diagnoses and all orders for this visit:    1. Recurrent genital herpes simplex (Primary)  -     valACYclovir (Valtrex) 500 MG tablet; Take 1 tablet by mouth Daily.  Dispense: 90 tablet; Refill: 1    Due to frequency of outbreaks, recommended suppressive therapy for the next 6 months and can then re-evaluate continued need with PCP  Rx for Valtrex daily.  Drink with plenty of water.     See PCP or RTC if symptoms persist/worsen  See PCP for routine f/u visit and management of chronic medical conditions      This document has been electronically signed by ASTRID Brice on September 2, 2021 12:48 CDT,.

## 2021-09-02 NOTE — PROGRESS NOTES
"The ABCs of the Annual Wellness Visit  Subsequent Medicare Wellness Visit    No chief complaint on file.     Subjective { Optional Links CC  Problem List  Visit Diagnosis  SnapShot  Encounters  Notes  Medications  Labs  Result Review Imaging  Media :23}   History of Present Illness:  Malcolm Cheng is a 66 y.o. male who presents for a Subsequent Medicare Wellness Visit.    The following portions of the patient's history were reviewed and   updated as appropriate: {history reviewed:20406::\"allergies\",\"current medications\",\"past family history\",\"past medical history\",\"past social history\",\"past surgical history\",\"problem list\"}. {Optional Link Review (Popup) :23}    Compared to one year ago, the patient feels his physical   health is {better worse same:76708}.    Compared to one year ago, the patient feels his mental   health is {better worse same:80729}.    Recent Hospitalizations:  {Hospital Admission Status in the last 365 days:04982}      Current Medical Providers:  Patient Care Team:  Gage Means MD as PCP - General (Family Medicine)    Outpatient Medications Prior to Visit   Medication Sig Dispense Refill   • Acetaminophen (TYLENOL ARTHRITIS PAIN PO) Take  by mouth.     • aspirin 81 MG EC tablet Take 81 mg by mouth Daily.     • Dapagliflozin Propanediol (Farxiga) 10 MG tablet Take 10 mg by mouth Daily. 30 tablet 3   • Diclofenac Sodium (VOLTAREN) 1 % gel gel Apply 4 g topically to the appropriate area as directed 2 (Two) Times a Day. 100 g 2   • icosapent ethyl (Vascepa) 1 g capsule capsule Vascepa 1 gram capsule     • meloxicam (MOBIC) 15 MG tablet meloxicam 15 mg tablet     • metFORMIN (GLUCOPHAGE) 1000 MG tablet Take 1,000 mg by mouth 2 (Two) Times a Day With Meals.     • metoprolol succinate XL (TOPROL-XL) 25 MG 24 hr tablet Take 25 mg by mouth Daily.     • Omega-3 Fatty Acids (fish oil) 1000 MG capsule capsule Take 1,000 mg by mouth.     • rosuvastatin (CRESTOR) 10 MG tablet Take 10 mg " by mouth Every Night.     • valACYclovir (Valtrex) 500 MG tablet Take 1 tablet by mouth Daily. 90 tablet 1   • vitamin B-12 (CYANOCOBALAMIN) 500 MCG tablet Take 1 tablet by mouth Daily. 30 tablet 3   • vitamin D (ERGOCALCIFEROL) 1.25 MG (53513 UT) capsule capsule Take 1 capsule by mouth Every 7 (Seven) Days. 4 capsule 3     No facility-administered medications prior to visit.       No opioid medication identified on active medication list. I have reviewed chart for other potential  high risk medication/s and harmful drug interactions in the elderly.          Aspirin is on active medication list. {ASPIRIN ON MEDICATION LIST INDICATED/NOT INDICATED:79924}.      Patient Active Problem List   Diagnosis   • Coronary artery disease   • Vitamin D deficiency, unspecified    • Controlled type 2 diabetes mellitus with complication, without long-term current use of insulin (CMS/[facility])   • Mixed hyperlipidemia   • Overweight   • Yeast infection   • Herpes infection   • Right shoulder pain   • Heart disease   • Encounter for colonoscopy due to history of colonic polyp   • Recurrent genital herpes simplex     Advance Care Planning {Optional Link Advance Care Planning :23}  Advance Directive is not on file.  {ACP Discussion, Advance Directive not in EMR:97000}          Objective {Optional Links Labs  Result Review  Imaging  Med Tab  Media  Procedures :23}    There were no vitals filed for this visit.  BMI Readings from Last 1 Encounters:   21 28.83 kg/m²   BMI is above normal parameters. Recommendations include: {BMI Follow-up Overweight:50920}    Does the patient have evidence of cognitive impairment? {Yes/No:82651}    Physical Exam            HEALTH RISK ASSESSMENT    Smoking Status:  Social History     Tobacco Use   Smoking Status Former Smoker   • Quit date:    • Years since quittin.6   Smokeless Tobacco Never Used     Alcohol Consumption:  Social History     Substance and Sexual Activity   Alcohol Use Not  Currently     Fall Risk Screen:    ANDREI Fall Risk Assessment was completed, and patient is at LOW risk for falls.Assessment completed on:3/5/2021    Depression Screening:  PHQ-2/PHQ-9 Depression Screening 1/27/2021   Little interest or pleasure in doing things 0   Feeling down, depressed, or hopeless 0   Total Score 0       Health Habits and Functional and Cognitive Screening:  No flowsheet data found.    Age-appropriate Screening Schedule:  Refer to the list below for future screening recommendations based on patient's age, sex and/or medical conditions. Orders for these recommended tests are listed in the plan section. The patient has been provided with a written plan.    Health Maintenance   Topic Date Due   • ZOSTER VACCINE (1 of 2) Never done   • HEMOGLOBIN A1C  07/28/2021   • INFLUENZA VACCINE  10/01/2021   • LIPID PANEL  01/28/2022   • URINE MICROALBUMIN  01/28/2022   • DIABETIC FOOT EXAM  03/05/2022   • DIABETIC EYE EXAM  03/05/2022   • TDAP/TD VACCINES (2 - Td or Tdap) 03/05/2031              Assessment/Plan {Optional Links CC Problem List  Visit Diagnosis   Review (Popup)  Health Maintenance  Quality  BestPractice  Medications  SmartSets  SnapShot Encounters  Media :23}    CMS Preventative Services Quick Reference  Risk Factors Identified During Encounter  {Medicare Wellness Risk Factors:41211}  The above risks/problems have been discussed with the patient.  Follow up actions/plans if indicated are seen below in the Assessment/Plan Section.  Pertinent information has been shared with the patient in the After Visit Summary.    There are no diagnoses linked to this encounter.    Follow Up: {Optional Link Wrapup :23}  No follow-ups on file.     An After Visit Summary and PPPS were given to the patient.    {Time Spent-Use for E/M Coding (Optional):45879}

## 2021-09-09 ENCOUNTER — OFFICE VISIT (OUTPATIENT)
Dept: FAMILY MEDICINE CLINIC | Facility: CLINIC | Age: 66
End: 2021-09-09

## 2021-09-09 VITALS
HEIGHT: 68 IN | BODY MASS INDEX: 28.52 KG/M2 | HEART RATE: 78 BPM | SYSTOLIC BLOOD PRESSURE: 132 MMHG | DIASTOLIC BLOOD PRESSURE: 80 MMHG | OXYGEN SATURATION: 98 % | WEIGHT: 188.2 LBS | TEMPERATURE: 98.2 F

## 2021-09-09 DIAGNOSIS — Z00.00 MEDICARE ANNUAL WELLNESS VISIT, INITIAL: Primary | ICD-10-CM

## 2021-09-09 PROCEDURE — G0439 PPPS, SUBSEQ VISIT: HCPCS | Performed by: FAMILY MEDICINE

## 2021-09-09 PROCEDURE — 1159F MED LIST DOCD IN RCRD: CPT | Performed by: FAMILY MEDICINE

## 2021-09-09 PROCEDURE — 1170F FXNL STATUS ASSESSED: CPT | Performed by: FAMILY MEDICINE

## 2021-09-09 RX ORDER — METOPROLOL SUCCINATE 25 MG/1
25 TABLET, EXTENDED RELEASE ORAL DAILY
Qty: 90 TABLET | Refills: 0 | Status: SHIPPED | OUTPATIENT
Start: 2021-09-09 | End: 2021-12-09

## 2021-09-09 NOTE — PROGRESS NOTES
The ABCs of the Annual Wellness Visit  Initial Medicare Wellness Visit    Chief Complaint   Patient presents with   • Annual Exam     Initial Medicare Wellness     Subjective   History of Present Illness:  Malcolm Cheng is a 66 y.o. male who presents for an Initial Medicare Wellness Visit.    The following portions of the patient's history were reviewed and   updated as appropriate: allergies, current medications, past family history, past medical history, past social history, past surgical history and problem list.     Compared to one year ago, the patient feels his physical   health is the same.    Compared to one year ago, the patient feels his mental   health is the same.    Recent Hospitalizations:  This patient has had a [facility] admission record on file within the last 365 days.    Current Medical Providers:  Patient Care Team:  Gage Means MD as PCP - General (Family Medicine)    Outpatient Medications Prior to Visit   Medication Sig Dispense Refill   • Acetaminophen (TYLENOL ARTHRITIS PAIN PO) Take  by mouth.     • aspirin 81 MG EC tablet Take 81 mg by mouth Daily.     • Dapagliflozin Propanediol (Farxiga) 10 MG tablet Take 10 mg by mouth Daily. 30 tablet 3   • Diclofenac Sodium (VOLTAREN) 1 % gel gel Apply 4 g topically to the appropriate area as directed 2 (Two) Times a Day. 100 g 2   • icosapent ethyl (Vascepa) 1 g capsule capsule Vascepa 1 gram capsule     • meloxicam (MOBIC) 15 MG tablet meloxicam 15 mg tablet     • metFORMIN (GLUCOPHAGE) 1000 MG tablet Take 1,000 mg by mouth 2 (Two) Times a Day With Meals.     • metoprolol succinate XL (TOPROL-XL) 25 MG 24 hr tablet Take 25 mg by mouth Daily.     • Omega-3 Fatty Acids (fish oil) 1000 MG capsule capsule Take 1,000 mg by mouth.     • rosuvastatin (CRESTOR) 10 MG tablet Take 10 mg by mouth Every Night.     • valACYclovir (Valtrex) 500 MG tablet Take 1 tablet by mouth Daily. 90 tablet 1   • vitamin B-12 (CYANOCOBALAMIN) 500 MCG tablet  "Take 1 tablet by mouth Daily. 30 tablet 3   • vitamin D (ERGOCALCIFEROL) 1.25 MG (20389 UT) capsule capsule Take 1 capsule by mouth Every 7 (Seven) Days. 4 capsule 3     No facility-administered medications prior to visit.       No opioid medication identified on active medication list. I have reviewed chart for other potential  high risk medication/s and harmful drug interactions in the elderly.          Aspirin is on active medication list. Aspirin use is indicated based on review of current medical condition/s. Pros and cons of this therapy have been discussed today. Benefits of this medication outweigh potential harm.  Patient has been encouraged to continue taking this medication.  .      Patient Active Problem List   Diagnosis   • Coronary artery disease   • Vitamin D deficiency, unspecified    • Controlled type 2 diabetes mellitus with complication, without long-term current use of insulin (CMS/[facility])   • Mixed hyperlipidemia   • Overweight   • Yeast infection   • Herpes infection   • Right shoulder pain   • Heart disease   • Encounter for colonoscopy due to history of colonic polyp   • Recurrent genital herpes simplex     Advance Care Planning  Advance Directive is not on file.  ACP discussion was held with the patient during this visit. Patient does not have an advance directive, information provided.          Objective       Vitals:    09/09/21 1154   BP: 132/80   BP Location: Right arm   Patient Position: Sitting   Cuff Size: Adult   Pulse: 78   Temp: 98.2 °F (36.8 °C)   SpO2: 98%   Weight: 85.4 kg (188 lb 3.2 oz)   Height: 172.7 cm (68\")   PainSc: 0-No pain     BMI Readings from Last 1 Encounters:   09/09/21 28.62 kg/m²   BMI is above normal parameters. Recommendations include: educational material, exercise counseling, none (medical contraindication), nutrition counseling, pharmacological intervention, referral to a nutritionist, referral to primary care and referral to a weight management program    Does " the patient have evidence of cognitive impairment? No    Physical Exam          HEALTH RISK ASSESSMENT    Smoking Status:  Social History     Tobacco Use   Smoking Status Former Smoker   • Quit date:    • Years since quittin.6   Smokeless Tobacco Never Used     Alcohol Consumption:  Social History     Substance and Sexual Activity   Alcohol Use Not Currently     Fall Risk Screen:    ANDREI Fall Risk Assessment was completed, and patient is at LOW risk for falls.Assessment completed on:2021    Depression Screen:   PHQ-2/PHQ-9 Depression Screening 2021   Little interest or pleasure in doing things 0   Feeling down, depressed, or hopeless 0   Total Score 0       Health Habits and Functional and Cognitive Screening:  Functional & Cognitive Status 2021   Do you have difficulty preparing food and eating? No   Do you have difficulty bathing yourself, getting dressed or grooming yourself? No   Do you have difficulty using the toilet? No   Do you have difficulty moving around from place to place? No   Do you have trouble with steps or getting out of a bed or a chair? No   Current Diet Well Balanced Diet   Dental Exam Up to date   Eye Exam Up to date   Exercise (times per week) 7 times per week   Current Exercises Include Walking   Do you need help using the phone?  No   Are you deaf or do you have serious difficulty hearing?  No   Do you need help with transportation? No   Do you need help shopping? No   Do you need help preparing meals?  No   Do you need help with housework?  No   Do you need help with laundry? No   Do you need help taking your medications? No   Do you need help managing money? No   Do you ever drive or ride in a car without wearing a seat belt? No   Have you felt unusual stress, anger or loneliness in the last month? No   Who do you live with? Spouse   If you need help, do you have trouble finding someone available to you? No   Have you been bothered in the last four weeks by sexual  problems? No   Do you have difficulty concentrating, remembering or making decisions? No       Age-appropriate Screening Schedule:  Refer to the list below for future screening recommendations based on patient's age, sex and/or medical conditions. Orders for these recommended tests are listed in the plan section. The patient has been provided with a written plan.    Health Maintenance   Topic Date Due   • HEMOGLOBIN A1C  07/28/2021   • ZOSTER VACCINE (1 of 2) 09/09/2022 (Originally 4/23/2004)   • INFLUENZA VACCINE  10/01/2021   • LIPID PANEL  01/28/2022   • URINE MICROALBUMIN  01/28/2022   • DIABETIC FOOT EXAM  03/05/2022   • DIABETIC EYE EXAM  03/05/2022   • TDAP/TD VACCINES (2 - Td or Tdap) 03/05/2031            Assessment/Plan   CMS Preventative Services Quick Reference  Risk Factors Identified During Encounter  Alcohol Misuse  Cardiovascular Disease  Chronic Pain   Dementia/Memory   Depression/Dysphoria  Drug Use/Abuse Identified or Suspected  Fall Risk-High or Moderate  Glaucoma or Family History of Glaucoma  Hearing Problem  Inadequate Social Support, Isolation, Loneliness, Lack of Transportation, Financial Difficulties, or Caregiver Stress   Inactivity/Sedentary  Obesity/Overweight   Polypharmacy  Tobacco Use/Dependance (use dotphrase .tobaccocessation for documentation)  Urinary Incontinence  The above risks/problems have been discussed with the patient.  Follow up actions/plans if indicated are seen below in the Assessment/Plan Section.  Pertinent information has been shared with the patient in the After Visit Summary.    Diagnoses and all orders for this visit:    1. Medicare annual wellness visit, initial (Primary)        Follow Up:  Return in about 1 year (around 9/9/2022) for Medicare Wellness.     An After Visit Summary and PPPS were made available to the patient.        I spent 30 minutes caring for Malcolm on this date of service. This time includes time spent by me in the following  activities:preparing for the visit, reviewing tests, obtaining and/or reviewing a separately obtained history, performing a medically appropriate examination and/or evaluation , counseling and educating the patient/family/caregiver, ordering medications, tests, or procedures, referring and communicating with other health care professionals , documenting information in the medical record and independently interpreting results and communicating that information with the patient/family/caregiver     Action plan Discussed please see Scanned documents  -repeat in 1 year        This document has been electronically signed by Gage Means MD on September 9, 2021 12:13 CDT

## 2021-09-09 NOTE — PATIENT INSTRUCTIONS
Medicare Wellness  Personal Prevention Plan of Service     Date of Office Visit:  2021  Encounter Provider:  Gage Means MD  Place of Service:  [facility]  Patient Name: Malcolm Cheng  :  1955    As part of the Medicare Wellness portion of your visit today, we are providing you with this personalized preventive plan of services (PPPS). This plan is based upon recommendations of the [place] Preventive Services Task Force (USPSTF) and the Advisory Committee on Immunization Practices (ACIP).    This lists the preventive care services that should be considered, and provides dates of when you are due. Items listed as completed are up-to-date and do not require any further intervention.    Health Maintenance   Topic Date Due   • HEMOGLOBIN A1C  2021   • ZOSTER VACCINE (1 of 2) 2022 (Originally 2004)   • INFLUENZA VACCINE  10/01/2021   • LIPID PANEL  2022   • URINE MICROALBUMIN  2022   • DIABETIC FOOT EXAM  2022   • DIABETIC EYE EXAM  2022   • ANNUAL WELLNESS VISIT  2022   • COLORECTAL CANCER SCREENING  2024   • TDAP/TD VACCINES (2 - Td or Tdap) 2031   • HEPATITIS C SCREENING  Completed   • Pneumococcal Vaccine 65+  Completed   • COVID-19 Vaccine  Discontinued       No orders of the defined types were placed in this encounter.      Return in about 1 year (around 2022) for Medicare Wellness.

## 2021-09-09 NOTE — TELEPHONE ENCOUNTER
Rx Refill Note  Requested Prescriptions     Pending Prescriptions Disp Refills   • metoprolol succinate XL (TOPROL-XL) 25 MG 24 hr tablet 90 tablet 0     Sig: Take 1 tablet by mouth Daily.   • metFORMIN (GLUCOPHAGE) 1000 MG tablet 180 tablet 0     Sig: Take 1 tablet by mouth 2 (Two) Times a Day With Meals.      Last office visit with prescribing clinician: 9/9/2021      Next office visit with prescribing clinician: 10/8/2021   {TIP  Encounters:        Antihyperglycemics Protocol [id number]/09/2021 01:40 PM   HgA1c in past 6 months       {TIP  Please add Last Relevant Lab Date if appropriate  {TIP  Is Refill Pharmacy correct? yes  Tristin Joyce LPN  09/09/21, 14:47 CDT

## 2021-09-10 RX ORDER — DAPAGLIFLOZIN 10 MG/1
TABLET, FILM COATED ORAL
Qty: 30 TABLET | Refills: 3 | Status: SHIPPED | OUTPATIENT
Start: 2021-09-10 | End: 2022-01-11

## 2021-10-06 RX ORDER — ROSUVASTATIN CALCIUM 10 MG/1
10 TABLET, COATED ORAL NIGHTLY
Qty: 90 TABLET | Refills: 1 | Status: SHIPPED | OUTPATIENT
Start: 2021-10-06 | End: 2022-04-14

## 2021-10-07 ENCOUNTER — TELEPHONE (OUTPATIENT)
Dept: FAMILY MEDICINE CLINIC | Facility: CLINIC | Age: 66
End: 2021-10-07

## 2021-10-08 ENCOUNTER — OFFICE VISIT (OUTPATIENT)
Dept: FAMILY MEDICINE CLINIC | Facility: CLINIC | Age: 66
End: 2021-10-08

## 2021-10-08 VITALS
SYSTOLIC BLOOD PRESSURE: 100 MMHG | HEIGHT: 68 IN | OXYGEN SATURATION: 98 % | WEIGHT: 188.4 LBS | HEART RATE: 74 BPM | BODY MASS INDEX: 28.55 KG/M2 | TEMPERATURE: 97.3 F | DIASTOLIC BLOOD PRESSURE: 62 MMHG

## 2021-10-08 DIAGNOSIS — A60.00 RECURRENT GENITAL HERPES SIMPLEX: ICD-10-CM

## 2021-10-08 DIAGNOSIS — E66.3 OVERWEIGHT: ICD-10-CM

## 2021-10-08 DIAGNOSIS — E55.9 VITAMIN D DEFICIENCY, UNSPECIFIED: ICD-10-CM

## 2021-10-08 DIAGNOSIS — E11.8 CONTROLLED TYPE 2 DIABETES MELLITUS WITH COMPLICATION, WITHOUT LONG-TERM CURRENT USE OF INSULIN (HCC): Primary | ICD-10-CM

## 2021-10-08 DIAGNOSIS — I51.9 HEART DISEASE: ICD-10-CM

## 2021-10-08 DIAGNOSIS — E78.2 MIXED HYPERLIPIDEMIA: ICD-10-CM

## 2021-10-08 PROCEDURE — 99214 OFFICE O/P EST MOD 30 MIN: CPT | Performed by: FAMILY MEDICINE

## 2021-10-13 ENCOUNTER — LAB (OUTPATIENT)
Dept: LAB | Facility: HOSPITAL | Age: 66
End: 2021-10-13

## 2021-10-13 DIAGNOSIS — I25.10 CORONARY ARTERY DISEASE: ICD-10-CM

## 2021-10-13 DIAGNOSIS — E11.8 CONTROLLED TYPE 2 DIABETES MELLITUS WITH COMPLICATION, WITHOUT LONG-TERM CURRENT USE OF INSULIN (HCC): ICD-10-CM

## 2021-10-13 DIAGNOSIS — E66.3 OVERWEIGHT: ICD-10-CM

## 2021-10-13 DIAGNOSIS — E78.2 MIXED HYPERLIPIDEMIA: ICD-10-CM

## 2021-10-13 DIAGNOSIS — B00.9 HERPES INFECTION: ICD-10-CM

## 2021-10-13 DIAGNOSIS — Z12.11 ENCOUNTER FOR SCREENING COLONOSCOPY: ICD-10-CM

## 2021-10-13 DIAGNOSIS — E55.9 VITAMIN D DEFICIENCY, UNSPECIFIED: ICD-10-CM

## 2021-10-13 DIAGNOSIS — I51.9 HEART DISEASE: ICD-10-CM

## 2021-10-13 DIAGNOSIS — A60.00 RECURRENT GENITAL HERPES SIMPLEX: ICD-10-CM

## 2021-10-13 LAB
25(OH)D3 SERPL-MCNC: 38.1 NG/ML
ALBUMIN SERPL-MCNC: 4.7 G/DL (ref 3.5–5.2)
ALBUMIN UR-MCNC: <1.2 MG/DL
ALBUMIN/GLOB SERPL: 1.6 G/DL
ALP SERPL-CCNC: 80 U/L (ref 39–117)
ALT SERPL W P-5'-P-CCNC: 25 U/L (ref 1–41)
ANION GAP SERPL CALCULATED.3IONS-SCNC: 10 MMOL/L (ref 5–15)
AST SERPL-CCNC: 19 U/L (ref 1–40)
BASOPHILS # BLD AUTO: 0.12 10*3/MM3 (ref 0–0.2)
BASOPHILS NFR BLD AUTO: 1.4 % (ref 0–1.5)
BILIRUB SERPL-MCNC: 0.3 MG/DL (ref 0–1.2)
BUN SERPL-MCNC: 21 MG/DL (ref 8–23)
BUN/CREAT SERPL: 22.8 (ref 7–25)
CALCIUM SPEC-SCNC: 9.6 MG/DL (ref 8.6–10.5)
CHLORIDE SERPL-SCNC: 102 MMOL/L (ref 98–107)
CHOLEST SERPL-MCNC: 149 MG/DL (ref 0–200)
CO2 SERPL-SCNC: 30 MMOL/L (ref 22–29)
CREAT SERPL-MCNC: 0.92 MG/DL (ref 0.76–1.27)
CREAT UR-MCNC: 98.3 MG/DL
DEPRECATED RDW RBC AUTO: 41.9 FL (ref 37–54)
EOSINOPHIL # BLD AUTO: 0.24 10*3/MM3 (ref 0–0.4)
EOSINOPHIL NFR BLD AUTO: 2.7 % (ref 0.3–6.2)
ERYTHROCYTE [DISTWIDTH] IN BLOOD BY AUTOMATED COUNT: 13 % (ref 12.3–15.4)
GFR SERPL CREATININE-BSD FRML MDRD: 82 ML/MIN/1.73
GLOBULIN UR ELPH-MCNC: 3 GM/DL
GLUCOSE SERPL-MCNC: 136 MG/DL (ref 65–99)
HBA1C MFR BLD: 6.9 % (ref 4.8–5.6)
HCT VFR BLD AUTO: 47.8 % (ref 37.5–51)
HDLC SERPL-MCNC: 53 MG/DL (ref 40–60)
HGB BLD-MCNC: 16.2 G/DL (ref 13–17.7)
IMM GRANULOCYTES # BLD AUTO: 0.01 10*3/MM3 (ref 0–0.05)
IMM GRANULOCYTES NFR BLD AUTO: 0.1 % (ref 0–0.5)
LDLC SERPL CALC-MCNC: 72 MG/DL (ref 0–100)
LDLC/HDLC SERPL: 1.29 {RATIO}
LYMPHOCYTES # BLD AUTO: 4.77 10*3/MM3 (ref 0.7–3.1)
LYMPHOCYTES NFR BLD AUTO: 54.2 % (ref 19.6–45.3)
MCH RBC QN AUTO: 30.3 PG (ref 26.6–33)
MCHC RBC AUTO-ENTMCNC: 33.9 G/DL (ref 31.5–35.7)
MCV RBC AUTO: 89.3 FL (ref 79–97)
MICROALBUMIN/CREAT UR: NORMAL MG/G{CREAT}
MONOCYTES # BLD AUTO: 0.48 10*3/MM3 (ref 0.1–0.9)
MONOCYTES NFR BLD AUTO: 5.5 % (ref 5–12)
NEUTROPHILS NFR BLD AUTO: 3.18 10*3/MM3 (ref 1.7–7)
NEUTROPHILS NFR BLD AUTO: 36.1 % (ref 42.7–76)
NRBC BLD AUTO-RTO: 0 /100 WBC (ref 0–0.2)
PLATELET # BLD AUTO: 188 10*3/MM3 (ref 140–450)
PMV BLD AUTO: 11 FL (ref 6–12)
POTASSIUM SERPL-SCNC: 4.5 MMOL/L (ref 3.5–5.2)
PROT SERPL-MCNC: 7.7 G/DL (ref 6–8.5)
RBC # BLD AUTO: 5.35 10*6/MM3 (ref 4.14–5.8)
SODIUM SERPL-SCNC: 142 MMOL/L (ref 136–145)
TRIGL SERPL-MCNC: 137 MG/DL (ref 0–150)
VIT B12 BLD-MCNC: 288 PG/ML (ref 211–946)
VLDLC SERPL-MCNC: 24 MG/DL (ref 5–40)
WBC # BLD AUTO: 8.8 10*3/MM3 (ref 3.4–10.8)

## 2021-10-13 PROCEDURE — 83036 HEMOGLOBIN GLYCOSYLATED A1C: CPT

## 2021-10-13 PROCEDURE — 80053 COMPREHEN METABOLIC PANEL: CPT

## 2021-10-13 PROCEDURE — 82043 UR ALBUMIN QUANTITATIVE: CPT

## 2021-10-13 PROCEDURE — 80061 LIPID PANEL: CPT

## 2021-10-13 PROCEDURE — 85025 COMPLETE CBC W/AUTO DIFF WBC: CPT

## 2021-10-13 PROCEDURE — 82306 VITAMIN D 25 HYDROXY: CPT

## 2021-10-13 PROCEDURE — 82570 ASSAY OF URINE CREATININE: CPT

## 2021-10-13 PROCEDURE — 82607 VITAMIN B-12: CPT

## 2021-10-14 ENCOUNTER — TELEPHONE (OUTPATIENT)
Dept: FAMILY MEDICINE CLINIC | Facility: CLINIC | Age: 66
End: 2021-10-14

## 2021-10-14 NOTE — TELEPHONE ENCOUNTER
----- Message from Gage Means MD sent at 10/14/2021  7:25 AM CDT -----  Please let pt know labwork stable although vitamin B12 is low and recommend pt start taking vitamin B12 500 mcg PO q daily give 30 pills and 3 refills    Also hga1c at 6.90 from 6.60. still at goal but needs to watch sugar and carb intake     Recheck on next visit thanks

## 2021-10-15 RX ORDER — CHOLECALCIFEROL (VITAMIN D3) 125 MCG
500 CAPSULE ORAL DAILY
Qty: 30 TABLET | Refills: 3 | Status: SHIPPED | OUTPATIENT
Start: 2021-10-15 | End: 2023-02-28

## 2021-12-09 RX ORDER — METOPROLOL SUCCINATE 25 MG/1
TABLET, EXTENDED RELEASE ORAL
Qty: 90 TABLET | Refills: 2 | Status: SHIPPED | OUTPATIENT
Start: 2021-12-09 | End: 2022-08-30 | Stop reason: SDUPTHER

## 2022-01-11 RX ORDER — DAPAGLIFLOZIN 10 MG/1
TABLET, FILM COATED ORAL
Qty: 30 TABLET | Refills: 3 | Status: SHIPPED | OUTPATIENT
Start: 2022-01-11 | End: 2022-05-16

## 2022-02-15 DIAGNOSIS — A60.00 RECURRENT GENITAL HERPES SIMPLEX: ICD-10-CM

## 2022-02-16 RX ORDER — VALACYCLOVIR HYDROCHLORIDE 500 MG/1
TABLET, FILM COATED ORAL
Qty: 90 TABLET | Refills: 1 | Status: SHIPPED | OUTPATIENT
Start: 2022-02-16 | End: 2022-08-30

## 2022-04-14 RX ORDER — ROSUVASTATIN CALCIUM 10 MG/1
TABLET, COATED ORAL
Qty: 90 TABLET | Refills: 1 | Status: SHIPPED | OUTPATIENT
Start: 2022-04-14 | End: 2022-08-30 | Stop reason: SDUPTHER

## 2022-05-16 RX ORDER — DAPAGLIFLOZIN 10 MG/1
TABLET, FILM COATED ORAL
Qty: 30 TABLET | Refills: 3 | Status: SHIPPED | OUTPATIENT
Start: 2022-05-16 | End: 2022-05-18 | Stop reason: SDUPTHER

## 2022-05-18 RX ORDER — DAPAGLIFLOZIN 10 MG/1
1 TABLET, FILM COATED ORAL DAILY
Qty: 90 TABLET | Refills: 3 | Status: SHIPPED | OUTPATIENT
Start: 2022-05-18 | End: 2022-08-30 | Stop reason: SDUPTHER

## 2022-06-14 ENCOUNTER — TELEPHONE (OUTPATIENT)
Dept: FAMILY MEDICINE CLINIC | Facility: CLINIC | Age: 67
End: 2022-06-14

## 2022-06-14 NOTE — TELEPHONE ENCOUNTER
Patients wife called and said they are having to go through the [employer] program because it cost so much. They told her the medication would take 10-15 days before they receive it. She was wondering if he could get 10-15 called in for him so he's not without. He only has 2 more left.

## 2022-06-14 NOTE — TELEPHONE ENCOUNTER
Spoke to pt's wife and made aware she could stop by the office and  2 boxes of sample for the Farxiga until Pt assistance arrives.

## 2022-08-18 NOTE — PROGRESS NOTES
Subjective:  Malcolm Cheng is a 67 y.o. male who presents for       Patient Active Problem List   Diagnosis   • Coronary artery disease   • Vitamin D deficiency, unspecified    • Controlled type 2 diabetes mellitus with complication, without long-term current use of insulin (HCC)   • Mixed hyperlipidemia   • Overweight   • Yeast infection   • Herpes infection   • Right shoulder pain   • Heart disease   • Encounter for colonoscopy due to history of colonic polyp   • Recurrent genital herpes simplex           Current Outpatient Medications:   •  aspirin 81 MG EC tablet, Take 81 mg by mouth Daily., Disp: , Rfl:   •  dapagliflozin Propanediol (Farxiga) 10 MG tablet, Take 10 mg by mouth Daily., Disp: 90 tablet, Rfl: 3  •  metFORMIN (GLUCOPHAGE) 1000 MG tablet, Take 1 tablet by mouth 2 (Two) Times a Day With Meals., Disp: 180 tablet, Rfl: 1  •  metoprolol succinate XL (TOPROL-XL) 25 MG 24 hr tablet, Take 1 tablet by mouth Daily., Disp: 90 tablet, Rfl: 2  •  Omega-3 Fatty Acids (fish oil) 1000 MG capsule capsule, Take 1,000 mg by mouth., Disp: , Rfl:   •  rosuvastatin (CRESTOR) 10 MG tablet, Take 1 tablet by mouth Every Night., Disp: 90 tablet, Rfl: 1  •  valACYclovir (VALTREX) 500 MG tablet, Take 1 tablet by mouth Daily., Disp: 90 tablet, Rfl: 1  •  vitamin B-12 (CYANOCOBALAMIN) 500 MCG tablet, Take 1 tablet by mouth Daily., Disp: 30 tablet, Rfl: 3  •  vitamin D (ERGOCALCIFEROL) 1.25 MG (68591 UT) capsule capsule, Take 1 capsule by mouth Every 7 (Seven) Days., Disp: 4 capsule, Rfl: 3    HPI        Pt is 68 yo male with management of being overweight  DM Type 2, HTN, HLP, history of genital herpes, CAD sp CABG x 4, history of colonic polyps, herpes type 1 and 2 positive, history of colonic polyps       10/8/21 in office visit for recheck on pt's above medical issues. Pt has yet to get labwork ordered on 3/5/21. He continues to tka his medications for HTN, HLP, DM type 2,  Vitamin D and vitamin B12 deficiency. Pt  doing well overall no chest pain no dizziness no fatigue. Doing well on medications     8/30/22 in office visit for recheck. Pt is due for new labwork. He is now retired. He is doing well on medicationn. No chest pain no dizziness. He continues to see Cardiologist in [place]. Pt denies any chest pain no dizziness. Breathing stable      Male  Problem  The patient's pertinent negatives include no genital injury, genital lesions, pelvic pain, penile discharge, penile pain, priapism, scrotal swelling or testicular pain. This is a recurrent problem. The current episode started today. The problem occurs constantly. The problem has been unchanged. The patient is experiencing no pain. Pertinent negatives include no abdominal pain, anorexia, chest pain, chills, constipation, coughing, diarrhea, discolored urine, dysuria, fever, flank pain, frequency, headaches, hematuria, hesitancy, joint pain, joint swelling, nausea, painful intercourse, rash, shortness of breath (), sore throat, urgency, urinary retention or vomiting. Associated symptoms comments: Genital lesion on penis . He has tried nothing for the symptoms. The treatment provided no relief. He is sexually active. No, his partner does not have an STD. There is no history of BPH, chlamydia, cryptorchidism, erectile aid use, erectile dysfunction, a femoral hernia, gonorrhea, herpes simplex, HIV, an inguinal hernia, kidney stones, prostatitis, sickle cell disease or varicocele.   Diabetes  He presents for his initial diabetic visit. He has type 2 diabetes mellitus. His disease course has been stable. Pertinent negatives for hypoglycemia include no confusion, dizziness, headaches, hunger, mood changes, nervousness/anxiousness, pallor, seizures, sleepiness, speech difficulty, sweats or tremors. Pertinent negatives for diabetes include no blurred vision, no chest pain, no fatigue, no foot paresthesias, no foot ulcerations, no polydipsia, no polyphagia, no polyuria, no  visual change, no weakness and no weight loss. Pertinent negatives for hypoglycemia complications include no blackouts, no hospitalization, no nocturnal hypoglycemia, no required assistance and no required glucagon injection. Symptoms are stable. Pertinent negatives for diabetic complications include no autonomic neuropathy, CVA, heart disease, impotence, nephropathy, PVD or retinopathy. Risk factors for coronary artery disease include diabetes mellitus, dyslipidemia, hypertension and sedentary lifestyle. Current diabetic treatment includes oral agent (dual therapy). He is compliant with treatment most of the time. His weight is stable. He is following a generally healthy diet. He has not had a previous visit with a dietitian. He participates in exercise intermittently. An ACE inhibitor/angiotensin II receptor blocker is not being taken. He does not see a podiatrist.Eye exam is not current.   Hypertension  This is a recurrent problem. The current episode started more than 1 month ago. The problem is unchanged. The problem is controlled. Pertinent negatives include no blurred vision, chest pain, headaches, shortness of breath () or sweats. Risk factors for coronary artery disease include diabetes mellitus, dyslipidemia, male gender and sedentary lifestyle. Past treatments include beta blockers. Current antihypertension treatment includes beta blockers. The current treatment provides significant improvement. There are no compliance problems.  There is no history of angina, kidney disease, CAD/MI, CVA, heart failure, left ventricular hypertrophy, PVD or retinopathy. There is no history of chronic renal disease, coarctation of the aorta, hyperaldosteronism, hypercortisolism, hyperparathyroidism, a hypertension causing med, pheochromocytoma, renovascular disease, sleep apnea or a thyroid problem.   Hyperlipidemia  This is a recurrent problem. The current episode started more than 1 month ago. The problem is controlled.  Recent lipid tests were reviewed and are variable. He has no history of chronic renal disease, diabetes, hypothyroidism, liver disease, obesity or nephrotic syndrome. Pertinent negatives include no chest pain or shortness of breath (). Current antihyperlipidemic treatment includes statins. The current treatment provides moderate improvement of lipids. Risk factors for coronary artery disease include male sex, dyslipidemia and diabetes mellitus.     Review of Systems  Review of Systems   Constitutional: Positive for activity change and fatigue. Negative for appetite change, chills, diaphoresis and fever.   HENT: Negative for congestion, postnasal drip, rhinorrhea, sinus pressure, sinus pain, sneezing, sore throat, trouble swallowing and voice change.    Respiratory: Negative for cough, choking, chest tightness, shortness of breath, wheezing and stridor.    Cardiovascular: Negative for chest pain.   Gastrointestinal: Negative for diarrhea, nausea and vomiting.   Musculoskeletal: Positive for arthralgias.   Neurological: Positive for weakness and numbness. Negative for headaches.       Patient Active Problem List   Diagnosis   • Coronary artery disease   • Vitamin D deficiency, unspecified    • Controlled type 2 diabetes mellitus with complication, without long-term current use of insulin (HCC)   • Mixed hyperlipidemia   • Overweight   • Yeast infection   • Herpes infection   • Right shoulder pain   • Heart disease   • Encounter for colonoscopy due to history of colonic polyp   • Recurrent genital herpes simplex     Past Surgical History:   Procedure Laterality Date   • APPENDECTOMY     • CARDIAC SURGERY     • CHOLECYSTECTOMY     • COLONOSCOPY N/A 5/3/2021    Procedure: COLONOSCOPY a month out;  Surgeon: Hector Dennison MD;  Location: [facility] ENDOSCOPY;  Service: Gastroenterology;  Laterality: N/A;     Social History     Socioeconomic History   • Marital status:    Tobacco Use   • Smoking status: Former Smoker      Quit date: 2007     Years since quitting: 15.6   • Smokeless tobacco: Never Used   Vaping Use   • Vaping Use: Never used   Substance and Sexual Activity   • Alcohol use: Not Currently   • Drug use: Never   • Sexual activity: Defer     Family History   Problem Relation Age of Onset   • Heart disease Father    • Alcohol abuse Father    • Cancer Brother      No visits with results within 6 Month(s) from this visit.   Latest known visit with results is:   Lab on 10/13/2021   Component Date Value Ref Range Status   • WBC 10/13/2021 8.80  3.40 - 10.80 10*3/mm3 Final   • RBC 10/13/2021 5.35  4.14 - 5.80 10*6/mm3 Final   • Hemoglobin 10/13/2021 16.2  13.0 - 17.7 g/dL Final   • Hematocrit 10/13/2021 47.8  37.5 - 51.0 % Final   • MCV 10/13/2021 89.3  79.0 - 97.0 fL Final   • MCH 10/13/2021 30.3  26.6 - 33.0 pg Final   • MCHC 10/13/2021 33.9  31.5 - 35.7 g/dL Final   • RDW 10/13/2021 13.0  12.3 - 15.4 % Final   • RDW-SD 10/13/2021 41.9  37.0 - 54.0 fl Final   • MPV 10/13/2021 11.0  6.0 - 12.0 fL Final   • Platelets 10/13/2021 188  140 - 450 10*3/mm3 Final   • Neutrophil % 10/13/2021 36.1 (A) 42.7 - 76.0 % Final   • Lymphocyte % 10/13/2021 54.2 (A) 19.6 - 45.3 % Final   • Monocyte % 10/13/2021 5.5  5.0 - 12.0 % Final   • Eosinophil % 10/13/2021 2.7  0.3 - 6.2 % Final   • Basophil % 10/13/2021 1.4  0.0 - 1.5 % Final   • Immature Grans % 10/13/2021 0.1  0.0 - 0.5 % Final   • Neutrophils, Absolute 10/13/2021 3.18  1.70 - 7.00 10*3/mm3 Final   • Lymphocytes, Absolute 10/13/2021 4.77 (A) 0.70 - 3.10 10*3/mm3 Final   • Monocytes, Absolute 10/13/2021 0.48  0.10 - 0.90 10*3/mm3 Final   • Eosinophils, Absolute 10/13/2021 0.24  0.00 - 0.40 10*3/mm3 Final   • Basophils, Absolute 10/13/2021 0.12  0.00 - 0.20 10*3/mm3 Final   • Immature Grans, Absolute 10/13/2021 0.01  0.00 - 0.05 10*3/mm3 Final   • nRBC 10/13/2021 0.0  0.0 - 0.2 /100 WBC Final   • Glucose 10/13/2021 136 (A) 65 - 99 mg/dL Final   • BUN 10/13/2021 21  8 - 23 mg/dL Final    • Creatinine 10/13/2021 0.92  0.76 - 1.27 mg/dL Final   • Sodium 10/13/2021 142  136 - 145 mmol/L Final   • Potassium 10/13/2021 4.5  3.5 - 5.2 mmol/L Final   • Chloride 10/13/2021 102  98 - 107 mmol/L Final   • CO2 10/13/2021 30.0 (A) 22.0 - 29.0 mmol/L Final   • Calcium 10/13/2021 9.6  8.6 - 10.5 mg/dL Final   • Total Protein 10/13/2021 7.7  6.0 - 8.5 g/dL Final   • Albumin 10/13/2021 4.70  3.50 - 5.20 g/dL Final   • ALT (SGPT) 10/13/2021 25  1 - 41 U/L Final   • AST (SGOT) 10/13/2021 19  1 - 40 U/L Final   • Alkaline Phosphatase 10/13/2021 80  39 - 117 U/L Final   • Total Bilirubin 10/13/2021 0.3  0.0 - 1.2 mg/dL Final   • eGFR Non  Amer 10/13/2021 82  >60 mL/min/1.73 Final   • Globulin 10/13/2021 3.0  gm/dL Final   • A/G Ratio 10/13/2021 1.6  g/dL Final   • BUN/Creatinine Ratio 10/13/2021 22.8  7.0 - 25.0 Final   • Anion Gap 10/13/2021 10.0  5.0 - 15.0 mmol/L Final   • Hemoglobin A1C 10/13/2021 6.90 (A) 4.80 - 5.60 % Final   • Total Cholesterol 10/13/2021 149  0 - 200 mg/dL Final   • Triglycerides 10/13/2021 137  0 - 150 mg/dL Final   • HDL Cholesterol 10/13/2021 53  40 - 60 mg/dL Final   • LDL Cholesterol  10/13/2021 72  0 - 100 mg/dL Final   • VLDL Cholesterol 10/13/2021 24  5 - 40 mg/dL Final   • LDL/HDL Ratio 10/13/2021 1.29   Final   • 25 Hydroxy, Vitamin D 10/13/2021 38.1  ng/ml Final   • Vitamin B-12 10/13/2021 288  211 - 946 pg/mL Final   • Microalbumin/Creatinine Ratio 10/13/2021    Final    Unable to calculate   • Creatinine, Urine 10/13/2021 98.3  mg/dL Final   • Microalbumin, Urine 10/13/2021 <1.2  mg/dL Final      XR Shoulder 2+ View Right  Narrative: Three view right shoulder    HISTORY: Right shoulder pain. Chronic pain.    AP films with the humerus in internal and external rotation and  scapular Y view were obtained.    COMPARISON: None    FINDINGS:   Hypertrophic change acromioclavicular joint.  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  No fracture or  "dislocation.  No other osseous or articular abnormality.  Sternotomy.  Impression: CONCLUSION:  Hypertrophic change acromioclavicular joint.    [id number]    Electronically signed by:  Isaiah Hurley MD  3/19/2021 9:04 AM CDT  Workstation: [id number]    [unfilled]  Immunization History   Administered Date(s) Administered   • COVID-19 (MODERNA) 1st, 2nd, 3rd Dose Only 02/25/2021, 02/25/2021, 03/24/2021, 03/25/2021   • Fluzone High-Dose 65+yrs 12/30/2021   • Pneumococcal Polysaccharide (PPSV23) 03/05/2021   • Tdap 03/05/2021       The following portions of the patient's history were reviewed and updated as appropriate: allergies, current medications, past family history, past medical history, past social history, past surgical history and problem list.        Physical Exam  /64 (BP Location: Right arm, Patient Position: Sitting, Cuff Size: Adult)   Pulse 66   Temp 97.9 °F (36.6 °C)   Ht 172.7 cm (68\")   Wt 88.2 kg (194 lb 6.4 oz)   SpO2 99%   BMI 29.56 kg/m²     Physical Exam  Vitals and nursing note reviewed.   Constitutional:       Appearance: He is well-developed. He is not diaphoretic.   HENT:      Head: Normocephalic and atraumatic.      Right Ear: External ear normal.   Eyes:      Conjunctiva/sclera: Conjunctivae normal.      Pupils: Pupils are equal, round, and reactive to light.   Cardiovascular:      Rate and Rhythm: Normal rate and regular rhythm.      Heart sounds: Normal heart sounds. No murmur heard.  Pulmonary:      Effort: Pulmonary effort is normal. No respiratory distress.      Breath sounds: Normal breath sounds.   Abdominal:      General: Bowel sounds are normal. There is no distension.      Palpations: Abdomen is soft.      Tenderness: There is no abdominal tenderness.   Musculoskeletal:         General: Tenderness present. No deformity. Normal range of motion.      Cervical back: Normal range of motion and neck supple.   Skin:     General: Skin is warm.      Coloration: Skin is not pale.     "  Findings: No erythema or rash.   Neurological:      Mental Status: He is alert and oriented to person, place, and time.      Cranial Nerves: No cranial nerve deficit.   Psychiatric:         Behavior: Behavior normal.         [unfilled]   Diagnosis Plan   1. Controlled type 2 diabetes mellitus with complication, without long-term current use of insulin (HCC)  CBC Auto Differential    Comprehensive Metabolic Panel    Hemoglobin A1c    Lipid Panel    TSH    Vitamin D 25 Hydroxy    Vitamin B12    T4, free   2. Vitamin D deficiency, unspecified   CBC Auto Differential    Comprehensive Metabolic Panel    Hemoglobin A1c    Lipid Panel    TSH    Vitamin D 25 Hydroxy    Vitamin B12    T4, free   3. Coronary artery disease, unspecified vessel or lesion type, unspecified whether angina present, unspecified whether native or transplanted heart  CBC Auto Differential    Comprehensive Metabolic Panel    Hemoglobin A1c    Lipid Panel    TSH    Vitamin D 25 Hydroxy    Vitamin B12    T4, free   4. Overweight  CBC Auto Differential    Comprehensive Metabolic Panel    Hemoglobin A1c    Lipid Panel    TSH    Vitamin D 25 Hydroxy    Vitamin B12    T4, free   5. Mixed hyperlipidemia  CBC Auto Differential    Comprehensive Metabolic Panel    Hemoglobin A1c    Lipid Panel    TSH    Vitamin D 25 Hydroxy    Vitamin B12    T4, free   6. Encounter for screening for endocrine disorder  CBC Auto Differential    Comprehensive Metabolic Panel    Hemoglobin A1c    Lipid Panel    TSH    Vitamin D 25 Hydroxy    Vitamin B12    T4, free   7. Recurrent genital herpes simplex  valACYclovir (VALTREX) 500 MG tablet              -recommend labwork  -recommend colonoscopy screening  -next colonoscopy in 2024   -recommend AAA screening - schedule at imaging center   -recommend lung cancer screening - schedule CT of chest at imaging center   -recommend pneumonia vaccination   -right shoulder pain/Ac joint arthropathy - Orthopedic  following   -constipation - was on  miralax 17 mg daily.  High fiber diet   -genital lesions/ulcer/genital herpes - will refer to Dermatology. Refilled valtrex for 500 mg PO BID for 10 days   -CAD sp CABG  X 4  Have records from [facility].  On aspirin crestor toprol XL   -HTN - on toprol XL 25 mg PO q daily.   -overweight - counseled weight loss >5 minutes BMI at 29.56   -DM type 2 on metformin 1000 mg PO q daily on farxiga 10 mg daily  -history of genital herpes -on valtrex  -HLP - on crestor 10 mg pO qhs start on Vascepa 2 grams PO BID   -advised pt to be safe and call with questions and concerns  -advised pt to go to ER or call 911 if symptoms worrisome or severe  -advised pt to followup with specialist and referrals  -advised pt to be safe during COVID-19 pandemic  .I spent 25  minutes caring for Juan on this date of service. This time includes time spent by me in the following activities: preparing for the visit, reviewing tests, obtaining and/or reviewing a separately obtained history, performing a medically appropriate examination and/or evaluation, counseling and educating the patient/family/caregiver, ordering medications, tests, or procedures, referring and communicating with other health care professionals, documenting information in the medical record and care coordination.   -recheck in 3 months         This document has been electronically signed by Gage Means MD on August 30, 2022 15:32 CDT                Answers for HPI/ROS submitted by the patient on 8/28/2022  What is the primary reason for your visit?: Physical

## 2022-08-30 ENCOUNTER — OFFICE VISIT (OUTPATIENT)
Dept: FAMILY MEDICINE CLINIC | Facility: CLINIC | Age: 67
End: 2022-08-30

## 2022-08-30 VITALS
TEMPERATURE: 97.9 F | HEART RATE: 66 BPM | SYSTOLIC BLOOD PRESSURE: 112 MMHG | DIASTOLIC BLOOD PRESSURE: 64 MMHG | WEIGHT: 194.4 LBS | BODY MASS INDEX: 29.46 KG/M2 | HEIGHT: 68 IN | OXYGEN SATURATION: 99 %

## 2022-08-30 DIAGNOSIS — I25.10 CORONARY ARTERY DISEASE, UNSPECIFIED VESSEL OR LESION TYPE, UNSPECIFIED WHETHER ANGINA PRESENT, UNSPECIFIED WHETHER NATIVE OR TRANSPLANTED HEART: ICD-10-CM

## 2022-08-30 DIAGNOSIS — E78.2 MIXED HYPERLIPIDEMIA: ICD-10-CM

## 2022-08-30 DIAGNOSIS — A60.00 RECURRENT GENITAL HERPES SIMPLEX: ICD-10-CM

## 2022-08-30 DIAGNOSIS — E66.3 OVERWEIGHT: ICD-10-CM

## 2022-08-30 DIAGNOSIS — E11.8 CONTROLLED TYPE 2 DIABETES MELLITUS WITH COMPLICATION, WITHOUT LONG-TERM CURRENT USE OF INSULIN: ICD-10-CM

## 2022-08-30 DIAGNOSIS — Z13.29 ENCOUNTER FOR SCREENING FOR ENDOCRINE DISORDER: ICD-10-CM

## 2022-08-30 DIAGNOSIS — Z12.2 ENCOUNTER FOR SCREENING FOR LUNG CANCER: Primary | ICD-10-CM

## 2022-08-30 DIAGNOSIS — Z13.6 SCREENING FOR AAA (ABDOMINAL AORTIC ANEURYSM): ICD-10-CM

## 2022-08-30 DIAGNOSIS — Z87.891 PERSONAL HISTORY OF NICOTINE DEPENDENCE: ICD-10-CM

## 2022-08-30 DIAGNOSIS — E55.9 VITAMIN D DEFICIENCY, UNSPECIFIED: ICD-10-CM

## 2022-08-30 PROCEDURE — 99214 OFFICE O/P EST MOD 30 MIN: CPT | Performed by: FAMILY MEDICINE

## 2022-08-30 RX ORDER — DAPAGLIFLOZIN 10 MG/1
1 TABLET, FILM COATED ORAL DAILY
Qty: 90 TABLET | Refills: 3 | Status: SHIPPED | OUTPATIENT
Start: 2022-08-30 | End: 2022-12-19 | Stop reason: SDUPTHER

## 2022-08-30 RX ORDER — ROSUVASTATIN CALCIUM 10 MG/1
10 TABLET, COATED ORAL NIGHTLY
Qty: 90 TABLET | Refills: 1 | Status: SHIPPED | OUTPATIENT
Start: 2022-08-30

## 2022-08-30 RX ORDER — ROSUVASTATIN CALCIUM 10 MG/1
10 TABLET, COATED ORAL NIGHTLY
Qty: 90 TABLET | Refills: 1 | Status: SHIPPED | OUTPATIENT
Start: 2022-08-30 | End: 2022-08-30 | Stop reason: SDUPTHER

## 2022-08-30 RX ORDER — METOPROLOL SUCCINATE 25 MG/1
25 TABLET, EXTENDED RELEASE ORAL DAILY
Qty: 90 TABLET | Refills: 2 | Status: SHIPPED | OUTPATIENT
Start: 2022-08-30 | End: 2022-08-30 | Stop reason: SDUPTHER

## 2022-08-30 RX ORDER — VALACYCLOVIR HYDROCHLORIDE 500 MG/1
500 TABLET, FILM COATED ORAL DAILY
Qty: 90 TABLET | Refills: 1 | Status: SHIPPED | OUTPATIENT
Start: 2022-08-30

## 2022-08-30 RX ORDER — METOPROLOL SUCCINATE 25 MG/1
25 TABLET, EXTENDED RELEASE ORAL DAILY
Qty: 90 TABLET | Refills: 1 | Status: SHIPPED | OUTPATIENT
Start: 2022-08-30

## 2022-08-30 NOTE — PATIENT INSTRUCTIONS
Call Gastroenterology for an appt     Get labwork    Will get AAA screening    Will get lung cancer screening    Will get records from Cardiology    Recheck in 3 months

## 2022-08-31 ENCOUNTER — LAB (OUTPATIENT)
Dept: LAB | Facility: HOSPITAL | Age: 67
End: 2022-08-31

## 2022-08-31 DIAGNOSIS — E55.9 VITAMIN D DEFICIENCY, UNSPECIFIED: ICD-10-CM

## 2022-08-31 DIAGNOSIS — I25.10 CORONARY ARTERY DISEASE, UNSPECIFIED VESSEL OR LESION TYPE, UNSPECIFIED WHETHER ANGINA PRESENT, UNSPECIFIED WHETHER NATIVE OR TRANSPLANTED HEART: ICD-10-CM

## 2022-08-31 DIAGNOSIS — Z13.29 ENCOUNTER FOR SCREENING FOR ENDOCRINE DISORDER: ICD-10-CM

## 2022-08-31 DIAGNOSIS — E11.8 CONTROLLED TYPE 2 DIABETES MELLITUS WITH COMPLICATION, WITHOUT LONG-TERM CURRENT USE OF INSULIN: ICD-10-CM

## 2022-08-31 DIAGNOSIS — E66.3 OVERWEIGHT: ICD-10-CM

## 2022-08-31 DIAGNOSIS — E78.2 MIXED HYPERLIPIDEMIA: ICD-10-CM

## 2022-08-31 LAB
25(OH)D3 SERPL-MCNC: 35.2 NG/ML (ref 30–100)
ALBUMIN SERPL-MCNC: 4.5 G/DL (ref 3.5–5.2)
ALBUMIN/GLOB SERPL: 1.7 G/DL
ALP SERPL-CCNC: 69 U/L (ref 39–117)
ALT SERPL W P-5'-P-CCNC: 19 U/L (ref 1–41)
ANION GAP SERPL CALCULATED.3IONS-SCNC: 11 MMOL/L (ref 5–15)
AST SERPL-CCNC: 14 U/L (ref 1–40)
BASOPHILS # BLD AUTO: 0.11 10*3/MM3 (ref 0–0.2)
BASOPHILS NFR BLD AUTO: 1.5 % (ref 0–1.5)
BILIRUB SERPL-MCNC: 0.5 MG/DL (ref 0–1.2)
BUN SERPL-MCNC: 14 MG/DL (ref 8–23)
BUN/CREAT SERPL: 14.7 (ref 7–25)
CALCIUM SPEC-SCNC: 9.2 MG/DL (ref 8.6–10.5)
CHLORIDE SERPL-SCNC: 104 MMOL/L (ref 98–107)
CHOLEST SERPL-MCNC: 121 MG/DL (ref 0–200)
CO2 SERPL-SCNC: 27 MMOL/L (ref 22–29)
CREAT SERPL-MCNC: 0.95 MG/DL (ref 0.76–1.27)
DEPRECATED RDW RBC AUTO: 41.2 FL (ref 37–54)
EGFRCR SERPLBLD CKD-EPI 2021: 87.2 ML/MIN/1.73
EOSINOPHIL # BLD AUTO: 0.14 10*3/MM3 (ref 0–0.4)
EOSINOPHIL NFR BLD AUTO: 1.9 % (ref 0.3–6.2)
ERYTHROCYTE [DISTWIDTH] IN BLOOD BY AUTOMATED COUNT: 13.3 % (ref 12.3–15.4)
GLOBULIN UR ELPH-MCNC: 2.6 GM/DL
GLUCOSE SERPL-MCNC: 121 MG/DL (ref 65–99)
HBA1C MFR BLD: 7.1 % (ref 4.8–5.6)
HCT VFR BLD AUTO: 44.8 % (ref 37.5–51)
HDLC SERPL-MCNC: 44 MG/DL (ref 40–60)
HGB BLD-MCNC: 16.3 G/DL (ref 13–17.7)
IMM GRANULOCYTES # BLD AUTO: 0.04 10*3/MM3 (ref 0–0.05)
IMM GRANULOCYTES NFR BLD AUTO: 0.5 % (ref 0–0.5)
LDLC SERPL CALC-MCNC: 52 MG/DL (ref 0–100)
LDLC/HDLC SERPL: 1.1 {RATIO}
LYMPHOCYTES # BLD AUTO: 3.3 10*3/MM3 (ref 0.7–3.1)
LYMPHOCYTES NFR BLD AUTO: 44.7 % (ref 19.6–45.3)
MCH RBC QN AUTO: 32 PG (ref 26.6–33)
MCHC RBC AUTO-ENTMCNC: 36.4 G/DL (ref 31.5–35.7)
MCV RBC AUTO: 87.8 FL (ref 79–97)
MONOCYTES # BLD AUTO: 0.32 10*3/MM3 (ref 0.1–0.9)
MONOCYTES NFR BLD AUTO: 4.3 % (ref 5–12)
NEUTROPHILS NFR BLD AUTO: 3.47 10*3/MM3 (ref 1.7–7)
NEUTROPHILS NFR BLD AUTO: 47.1 % (ref 42.7–76)
NRBC BLD AUTO-RTO: 0 /100 WBC (ref 0–0.2)
PLATELET # BLD AUTO: 244 10*3/MM3 (ref 140–450)
PMV BLD AUTO: 12 FL (ref 6–12)
POTASSIUM SERPL-SCNC: 4.2 MMOL/L (ref 3.5–5.2)
PROT SERPL-MCNC: 7.1 G/DL (ref 6–8.5)
RBC # BLD AUTO: 5.1 10*6/MM3 (ref 4.14–5.8)
SODIUM SERPL-SCNC: 142 MMOL/L (ref 136–145)
T4 FREE SERPL-MCNC: 1 NG/DL (ref 0.93–1.7)
TRIGL SERPL-MCNC: 142 MG/DL (ref 0–150)
TSH SERPL DL<=0.05 MIU/L-ACNC: 1.58 UIU/ML (ref 0.27–4.2)
VIT B12 BLD-MCNC: 1184 PG/ML (ref 211–946)
VLDLC SERPL-MCNC: 25 MG/DL (ref 5–40)
WBC NRBC COR # BLD: 7.38 10*3/MM3 (ref 3.4–10.8)

## 2022-08-31 PROCEDURE — 80053 COMPREHEN METABOLIC PANEL: CPT

## 2022-08-31 PROCEDURE — 84439 ASSAY OF FREE THYROXINE: CPT

## 2022-08-31 PROCEDURE — 83036 HEMOGLOBIN GLYCOSYLATED A1C: CPT

## 2022-08-31 PROCEDURE — 82306 VITAMIN D 25 HYDROXY: CPT

## 2022-08-31 PROCEDURE — 80061 LIPID PANEL: CPT

## 2022-08-31 PROCEDURE — 84443 ASSAY THYROID STIM HORMONE: CPT

## 2022-08-31 PROCEDURE — 85025 COMPLETE CBC W/AUTO DIFF WBC: CPT

## 2022-08-31 PROCEDURE — 82607 VITAMIN B-12: CPT

## 2022-09-09 ENCOUNTER — OFFICE VISIT (OUTPATIENT)
Dept: FAMILY MEDICINE CLINIC | Facility: CLINIC | Age: 67
End: 2022-09-09

## 2022-09-09 VITALS
DIASTOLIC BLOOD PRESSURE: 62 MMHG | HEIGHT: 68 IN | SYSTOLIC BLOOD PRESSURE: 108 MMHG | TEMPERATURE: 96.8 F | WEIGHT: 193.2 LBS | BODY MASS INDEX: 29.28 KG/M2 | OXYGEN SATURATION: 99 % | HEART RATE: 84 BPM

## 2022-09-09 DIAGNOSIS — Z00.00 MEDICARE ANNUAL WELLNESS VISIT, SUBSEQUENT: Primary | ICD-10-CM

## 2022-09-09 PROCEDURE — G0439 PPPS, SUBSEQ VISIT: HCPCS | Performed by: FAMILY MEDICINE

## 2022-09-09 PROCEDURE — 1126F AMNT PAIN NOTED NONE PRSNT: CPT | Performed by: FAMILY MEDICINE

## 2022-09-09 PROCEDURE — 1159F MED LIST DOCD IN RCRD: CPT | Performed by: FAMILY MEDICINE

## 2022-09-09 PROCEDURE — 1170F FXNL STATUS ASSESSED: CPT | Performed by: FAMILY MEDICINE

## 2022-09-09 NOTE — PROGRESS NOTES
The ABCs of the Annual Wellness Visit  Subsequent Medicare Wellness Visit    Chief Complaint   Patient presents with   • Annual Exam     Subsequent Medicare Wellness      Subjective    History of Present Illness:  Malcolm Cheng is a 67 y.o. male who presents for a Subsequent Medicare Wellness Visit.    The following portions of the patient's history were reviewed and   updated as appropriate: allergies, current medications, past family history, past medical history, past social history, past surgical history and problem list.    Compared to one year ago, the patient feels his physical   health is the same.    Compared to one year ago, the patient feels his mental   health is the same.    Recent Hospitalizations:  He was not admitted to the hospital during the last year.       Current Medical Providers:  Patient Care Team:  Gage Means MD as PCP - General (Family Medicine)    Outpatient Medications Prior to Visit   Medication Sig Dispense Refill   • aspirin 81 MG EC tablet Take 81 mg by mouth Daily.     • dapagliflozin Propanediol (Farxiga) 10 MG tablet Take 10 mg by mouth Daily. 90 tablet 3   • metFORMIN (GLUCOPHAGE) 1000 MG tablet Take 1 tablet by mouth 2 (Two) Times a Day With Meals. 180 tablet 1   • metoprolol succinate XL (TOPROL-XL) 25 MG 24 hr tablet Take 1 tablet by mouth Daily. 90 tablet 1   • Omega-3 Fatty Acids (fish oil) 1000 MG capsule capsule Take 1,000 mg by mouth.     • rosuvastatin (CRESTOR) 10 MG tablet Take 1 tablet by mouth Every Night. 90 tablet 1   • valACYclovir (VALTREX) 500 MG tablet Take 1 tablet by mouth Daily. 90 tablet 1   • vitamin B-12 (CYANOCOBALAMIN) 500 MCG tablet Take 1 tablet by mouth Daily. 30 tablet 3   • vitamin D (ERGOCALCIFEROL) 1.25 MG (11495 UT) capsule capsule Take 1 capsule by mouth Every 7 (Seven) Days. 4 capsule 3     No facility-administered medications prior to visit.       No opioid medication identified on active medication list. I have reviewed chart  "for other potential  high risk medication/s and harmful drug interactions in the elderly.          Aspirin is on active medication list. Aspirin use is indicated based on review of current medical condition/s. Pros and cons of this therapy have been discussed today. Benefits of this medication outweigh potential harm.  Patient has been encouraged to continue taking this medication.  .      Patient Active Problem List   Diagnosis   • Coronary artery disease   • Vitamin D deficiency, unspecified    • Controlled type 2 diabetes mellitus with complication, without long-term current use of insulin (HCC)   • Mixed hyperlipidemia   • Overweight   • Yeast infection   • Herpes infection   • Right shoulder pain   • Heart disease   • Encounter for colonoscopy due to history of colonic polyp   • Recurrent genital herpes simplex     Advance Care Planning  Advance Directive is not on file.  ACP discussion was held with the patient during this visit. Patient does not have an advance directive, information provided.          Objective    Vitals:    09/09/22 1123   BP: 108/62   BP Location: Left arm   Patient Position: Sitting   Cuff Size: Adult   Pulse: 84   Temp: 96.8 °F (36 °C)   SpO2: 99%   Weight: 87.6 kg (193 lb 3.2 oz)   Height: 172.7 cm (68\")   PainSc: 0-No pain     Estimated body mass index is 29.38 kg/m² as calculated from the following:    Height as of this encounter: 172.7 cm (68\").    Weight as of this encounter: 87.6 kg (193 lb 3.2 oz).    BMI is >= 25 and <30. (Overweight) The following options were offered after discussion;: weight loss educational material (shared in after visit summary), exercise counseling/recommendations and nutrition counseling/recommendations      Does the patient have evidence of cognitive impairment? No    Physical Exam  Lab Results   Component Value Date    TRIG 142 08/31/2022    HDL 44 08/31/2022    LDL 52 08/31/2022    VLDL 25 08/31/2022    HGBA1C 7.10 (H) 08/31/2022            HEALTH RISK " ASSESSMENT    Smoking Status:  Social History     Tobacco Use   Smoking Status Former Smoker   • Quit date: 2007   • Years since quitting: 15.6   Smokeless Tobacco Never Used     Alcohol Consumption:  Social History     Substance and Sexual Activity   Alcohol Use Not Currently     Fall Risk Screen:    ANDREI Fall Risk Assessment was completed, and patient is at LOW risk for falls.Assessment completed on:9/9/2022    Depression Screening:  PHQ-2/PHQ-9 Depression Screening 8/30/2022   Retired PHQ-9 Total Score -   Retired Total Score -   Little Interest or Pleasure in Doing Things 0-->not at all   Feeling Down, Depressed or Hopeless 0-->not at all   PHQ-9: Brief Depression Severity Measure Score 0       Health Habits and Functional and Cognitive Screening:  Functional & Cognitive Status 9/9/2022   Do you have difficulty preparing food and eating? No   Do you have difficulty bathing yourself, getting dressed or grooming yourself? No   Do you have difficulty using the toilet? No   Do you have difficulty moving around from place to place? No   Do you have trouble with steps or getting out of a bed or a chair? No   Current Diet Well Balanced Diet   Dental Exam Up to date   Eye Exam Up to date   Exercise (times per week) 7 times per week   Current Exercises Include Walking   Do you need help using the phone?  No   Are you deaf or do you have serious difficulty hearing?  No   Do you need help with transportation? No   Do you need help shopping? No   Do you need help preparing meals?  No   Do you need help with housework?  No   Do you need help with laundry? No   Do you need help taking your medications? No   Do you need help managing money? No   Do you ever drive or ride in a car without wearing a seat belt? No   Have you felt unusual stress, anger or loneliness in the last month? No   Who do you live with? Spouse   If you need help, do you have trouble finding someone available to you? No   Have you been bothered in the  last four weeks by sexual problems? No   Do you have difficulty concentrating, remembering or making decisions? No       Age-appropriate Screening Schedule:  Refer to the list below for future screening recommendations based on patient's age, sex and/or medical conditions. Orders for these recommended tests are listed in the plan section. The patient has been provided with a written plan.    Health Maintenance   Topic Date Due   • ZOSTER VACCINE (1 of 2) 09/09/2022 (Originally 4/23/2004)   • INFLUENZA VACCINE  10/01/2022   • URINE MICROALBUMIN  10/13/2022   • HEMOGLOBIN A1C  02/28/2023   • DIABETIC EYE EXAM  05/17/2023   • DIABETIC FOOT EXAM  08/30/2023   • LIPID PANEL  08/31/2023   • TDAP/TD VACCINES (2 - Td or Tdap) 03/05/2031              Assessment & Plan   CMS Preventative Services Quick Reference  Risk Factors Identified During Encounter  Alcohol Misuse  Cardiovascular Disease  Chronic Pain   Dementia/Memory   Depression/Dysphoria  Drug Use/Abuse Identified or Suspected  Fall Risk-High or Moderate  Glaucoma or Family History of Glaucoma  Hearing Problem  Inadequate Social Support, Isolation, Loneliness, Lack of Transportation, Financial Difficulties, or Caregiver Stress   Inactivity/Sedentary  Obesity/Overweight   Polypharmacy  High Risk Sexual Behavior   Tobacco Use/Dependance (use dotphrase .tobaccocessation for documentation)  Urinary Incontinence  The above risks/problems have been discussed with the patient.  Follow up actions/plans if indicated are seen below in the Assessment/Plan Section.  Pertinent information has been shared with the patient in the After Visit Summary.    Diagnoses and all orders for this visit:    1. Medicare annual wellness visit, subsequent (Primary)        Follow Up:   Return in about 1 year (around 9/9/2023) for Medicare Wellness.     An After Visit Summary and PPPS were made available to the patient.                 -Action plan discussed please see scanned documents  -repeat  in 1 year        This document has been electronically signed by Gage Means MD on September 9, 2022 11:40 CDT

## 2022-09-09 NOTE — PATIENT INSTRUCTIONS
Medicare Wellness  Personal Prevention Plan of Service     Date of Office Visit:    Encounter Provider:  Gage Means MD  Place of Service:  [facility]  Patient Name: Malcolm Cheng  :  1955    As part of the Medicare Wellness portion of your visit today, we are providing you with this personalized preventive plan of services (PPPS). This plan is based upon recommendations of the [place] Preventive Services Task Force (USPSTF) and the Advisory Committee on Immunization Practices (ACIP).    This lists the preventive care services that should be considered, and provides dates of when you are due. Items listed as completed are up-to-date and do not require any further intervention.    Health Maintenance   Topic Date Due    AAA SCREEN (ONE-TIME)  Never done    ZOSTER VACCINE (1 of 2) 2022 (Originally 2004)    Pneumococcal Vaccine 65+ (2 - PCV) 2026 (Originally 3/5/2022)    INFLUENZA VACCINE  10/01/2022    URINE MICROALBUMIN  10/13/2022    HEMOGLOBIN A1C  2023    DIABETIC EYE EXAM  2023    DIABETIC FOOT EXAM  2023    LIPID PANEL  2023    ANNUAL WELLNESS VISIT  2023    COLORECTAL CANCER SCREENING  2024    TDAP/TD VACCINES (2 - Td or Tdap) 2031    HEPATITIS C SCREENING  Completed    COVID-19 Vaccine  Discontinued       No orders of the defined types were placed in this encounter.      Return in about 1 year (around 2023) for Medicare Wellness.

## 2022-09-13 ENCOUNTER — TELEPHONE (OUTPATIENT)
Dept: FAMILY MEDICINE CLINIC | Facility: CLINIC | Age: 67
End: 2022-09-13

## 2022-09-13 NOTE — TELEPHONE ENCOUNTER
Suzan from [facility] called in regards to a CT scan. Would like a call back from Lazara WILSON MA.  Call back number: [phone]

## 2022-09-23 DIAGNOSIS — Z12.2 ENCOUNTER FOR SCREENING FOR LUNG CANCER: ICD-10-CM

## 2022-09-23 DIAGNOSIS — Z87.891 PERSONAL HISTORY OF NICOTINE DEPENDENCE: ICD-10-CM

## 2022-09-30 DIAGNOSIS — Z13.6 SCREENING FOR AAA (ABDOMINAL AORTIC ANEURYSM): ICD-10-CM

## 2022-10-24 ENCOUNTER — TELEPHONE (OUTPATIENT)
Dept: FAMILY MEDICINE CLINIC | Facility: CLINIC | Age: 67
End: 2022-10-24

## 2022-10-24 DIAGNOSIS — I25.10 CORONARY ARTERY DISEASE INVOLVING NATIVE CORONARY ARTERY OF NATIVE HEART, UNSPECIFIED WHETHER ANGINA PRESENT: Primary | ICD-10-CM

## 2022-10-24 DIAGNOSIS — Z95.1 S/P CABG (CORONARY ARTERY BYPASS GRAFT): ICD-10-CM

## 2022-11-01 ENCOUNTER — TELEPHONE (OUTPATIENT)
Dept: FAMILY MEDICINE CLINIC | Facility: CLINIC | Age: 67
End: 2022-11-01

## 2022-11-11 NOTE — PROGRESS NOTES
Subjective:  Malcolm Cheng is a 67 y.o. male who presents for       Patient Active Problem List   Diagnosis   • Coronary artery disease   • Vitamin D deficiency, unspecified    • Controlled type 2 diabetes mellitus with complication, without long-term current use of insulin (HCC)   • Mixed hyperlipidemia   • Overweight   • Yeast infection   • Herpes infection   • Right shoulder pain   • Heart disease   • Encounter for colonoscopy due to history of colonic polyp   • Recurrent genital herpes simplex           Current Outpatient Medications:   •  aspirin 81 MG EC tablet, Take 81 mg by mouth Daily., Disp: , Rfl:   •  dapagliflozin Propanediol (Farxiga) 10 MG tablet, Take 10 mg by mouth Daily., Disp: 90 tablet, Rfl: 3  •  metFORMIN (GLUCOPHAGE) 1000 MG tablet, Take 1 tablet by mouth 2 (Two) Times a Day With Meals., Disp: 180 tablet, Rfl: 1  •  metoprolol succinate XL (TOPROL-XL) 25 MG 24 hr tablet, Take 1 tablet by mouth Daily., Disp: 90 tablet, Rfl: 1  •  Omega-3 Fatty Acids (fish oil) 1000 MG capsule capsule, Take 1,000 mg by mouth., Disp: , Rfl:   •  rosuvastatin (CRESTOR) 10 MG tablet, Take 1 tablet by mouth Every Night., Disp: 90 tablet, Rfl: 1  •  valACYclovir (VALTREX) 500 MG tablet, Take 1 tablet by mouth Daily., Disp: 90 tablet, Rfl: 1  •  vitamin B-12 (CYANOCOBALAMIN) 500 MCG tablet, Take 1 tablet by mouth Daily., Disp: 30 tablet, Rfl: 3  •  vitamin D (ERGOCALCIFEROL) 1.25 MG (31854 UT) capsule capsule, Take 1 capsule by mouth Every 7 (Seven) Days., Disp: 4 capsule, Rfl: 3    HPI        Pt is 68 yo male with management of being overweight  DM Type 2, HTN, HLP, history of genital herpes, CAD sp CABG x 4, history of colonic polyps, herpes type 1 and 2 positive, history of colonic polyps          8/30/22 in office visit for recheck. Pt is due for new labwork. He is now retired. He is doing well on medicationn. No chest pain no dizziness. He continues to see Cardiologist in [place]. Pt denies any chest  pain no dizziness. Breathing stable     11/30/22 in office visit for recheck. Pt had labwork done on 8/31/22 that showed high vitamin B12. hga1c was at 7.10. from 6.90. CMP showed glucose at 122 GFR at 87.2 CBC showed stable hemoglobin and WBC. His AAA screening recently was negative. Also his recent CT of chest lung cancer screening showed no suspicious nodules; pt is doing well. BP is stable . Breathing stable.       Diabetes  He presents for his initial diabetic visit. He has type 2 diabetes mellitus. His disease course has been stable. Pertinent negatives for hypoglycemia include no confusion, dizziness, headaches, hunger, mood changes, nervousness/anxiousness, pallor, seizures, sleepiness, speech difficulty, sweats or tremors. Pertinent negatives for diabetes include no blurred vision, no chest pain, no fatigue, no foot paresthesias, no foot ulcerations, no polydipsia, no polyphagia, no polyuria, no visual change, no weakness and no weight loss. Pertinent negatives for hypoglycemia complications include no blackouts, no hospitalization, no nocturnal hypoglycemia, no required assistance and no required glucagon injection. Symptoms are stable. Pertinent negatives for diabetic complications include no autonomic neuropathy, CVA, heart disease, impotence, nephropathy, PVD or retinopathy. Risk factors for coronary artery disease include diabetes mellitus, dyslipidemia, hypertension and sedentary lifestyle. Current diabetic treatment includes oral agent (dual therapy). He is compliant with treatment most of the time. His weight is stable. He is following a generally healthy diet. He has not had a previous visit with a dietitian. He participates in exercise intermittently. An ACE inhibitor/angiotensin II receptor blocker is not being taken. He does not see a podiatrist.Eye exam is not current.   Hypertension  This is a recurrent problem. The current episode started more than 1 month ago. The problem is unchanged. The  problem is controlled. Pertinent negatives include no blurred vision, chest pain, headaches, shortness of breath () or sweats. Risk factors for coronary artery disease include diabetes mellitus, dyslipidemia, male gender and sedentary lifestyle. Past treatments include beta blockers. Current antihypertension treatment includes beta blockers. The current treatment provides significant improvement. There are no compliance problems.  There is no history of angina, kidney disease, CAD/MI, CVA, heart failure, left ventricular hypertrophy, PVD or retinopathy. There is no history of chronic renal disease, coarctation of the aorta, hyperaldosteronism, hypercortisolism, hyperparathyroidism, a hypertension causing med, pheochromocytoma, renovascular disease, sleep apnea or a thyroid problem.   Hyperlipidemia  This is a recurrent problem. The current episode started more than 1 month ago. The problem is controlled. Recent lipid tests were reviewed and are variable. He has no history of chronic renal disease, diabetes, hypothyroidism, liver disease, obesity or nephrotic syndrome. Pertinent negatives include no chest pain or shortness of breath (). Current antihyperlipidemic treatment includes statins. The current treatment provides moderate improvement of lipids. Risk factors for coronary artery disease include male sex, dyslipidemia and diabetes mellitus.     Review of Systems  Review of Systems   Constitutional: Positive for activity change and fatigue. Negative for appetite change, chills, diaphoresis and fever.   HENT: Negative for congestion, postnasal drip, rhinorrhea, sinus pressure, sinus pain, sneezing, sore throat, trouble swallowing and voice change.    Respiratory: Negative for cough, choking, chest tightness, shortness of breath, wheezing and stridor.    Cardiovascular: Negative for chest pain.   Gastrointestinal: Negative for diarrhea, nausea and vomiting.   Musculoskeletal: Positive for arthralgias.    Neurological: Positive for weakness and numbness. Negative for headaches.       Patient Active Problem List   Diagnosis   • Coronary artery disease   • Vitamin D deficiency, unspecified    • Controlled type 2 diabetes mellitus with complication, without long-term current use of insulin (HCC)   • Mixed hyperlipidemia   • Overweight   • Yeast infection   • Herpes infection   • Right shoulder pain   • Heart disease   • Encounter for colonoscopy due to history of colonic polyp   • Recurrent genital herpes simplex     Past Surgical History:   Procedure Laterality Date   • APPENDECTOMY     • CARDIAC SURGERY     • CHOLECYSTECTOMY     • COLONOSCOPY N/A 5/3/2021    Procedure: COLONOSCOPY a month out;  Surgeon: Hector Dennison MD;  Location: [facility] ENDOSCOPY;  Service: Gastroenterology;  Laterality: N/A;     Social History     Socioeconomic History   • Marital status:    Tobacco Use   • Smoking status: Former     Types: Cigarettes     Quit date: 2007     Years since quitting: 15.9   • Smokeless tobacco: Never   Vaping Use   • Vaping Use: Never used   Substance and Sexual Activity   • Alcohol use: Not Currently   • Drug use: Never   • Sexual activity: Defer     Family History   Problem Relation Age of Onset   • Heart disease Father    • Alcohol abuse Father    • Cancer Brother      Lab on 08/31/2022   Component Date Value Ref Range Status   • WBC 08/31/2022 7.38  3.40 - 10.80 10*3/mm3 Final   • RBC 08/31/2022 5.10  4.14 - 5.80 10*6/mm3 Final   • Hemoglobin 08/31/2022 16.3  13.0 - 17.7 g/dL Final   • Hematocrit 08/31/2022 44.8  37.5 - 51.0 % Final   • MCV 08/31/2022 87.8  79.0 - 97.0 fL Final   • MCH 08/31/2022 32.0  26.6 - 33.0 pg Final   • MCHC 08/31/2022 36.4 (H)  31.5 - 35.7 g/dL Final   • RDW 08/31/2022 13.3  12.3 - 15.4 % Final   • RDW-SD 08/31/2022 41.2  37.0 - 54.0 fl Final   • MPV 08/31/2022 12.0  6.0 - 12.0 fL Final   • Platelets 08/31/2022 244  140 - 450 10*3/mm3 Final   • Neutrophil % 08/31/2022 47.1  42.7  - 76.0 % Final   • Lymphocyte % 08/31/2022 44.7  19.6 - 45.3 % Final   • Monocyte % 08/31/2022 4.3 (L)  5.0 - 12.0 % Final   • Eosinophil % 08/31/2022 1.9  0.3 - 6.2 % Final   • Basophil % 08/31/2022 1.5  0.0 - 1.5 % Final   • Immature Grans % 08/31/2022 0.5  0.0 - 0.5 % Final   • Neutrophils, Absolute 08/31/2022 3.47  1.70 - 7.00 10*3/mm3 Final   • Lymphocytes, Absolute 08/31/2022 3.30 (H)  0.70 - 3.10 10*3/mm3 Final   • Monocytes, Absolute 08/31/2022 0.32  0.10 - 0.90 10*3/mm3 Final   • Eosinophils, Absolute 08/31/2022 0.14  0.00 - 0.40 10*3/mm3 Final   • Basophils, Absolute 08/31/2022 0.11  0.00 - 0.20 10*3/mm3 Final   • Immature Grans, Absolute 08/31/2022 0.04  0.00 - 0.05 10*3/mm3 Final   • nRBC 08/31/2022 0.0  0.0 - 0.2 /100 WBC Final   • Glucose 08/31/2022 121 (H)  65 - 99 mg/dL Final   • BUN 08/31/2022 14  8 - 23 mg/dL Final   • Creatinine 08/31/2022 0.95  0.76 - 1.27 mg/dL Final   • Sodium 08/31/2022 142  136 - 145 mmol/L Final   • Potassium 08/31/2022 4.2  3.5 - 5.2 mmol/L Final   • Chloride 08/31/2022 104  98 - 107 mmol/L Final   • CO2 08/31/2022 27.0  22.0 - 29.0 mmol/L Final   • Calcium 08/31/2022 9.2  8.6 - 10.5 mg/dL Final   • Total Protein 08/31/2022 7.1  6.0 - 8.5 g/dL Final   • Albumin 08/31/2022 4.50  3.50 - 5.20 g/dL Final   • ALT (SGPT) 08/31/2022 19  1 - 41 U/L Final   • AST (SGOT) 08/31/2022 14  1 - 40 U/L Final   • Alkaline Phosphatase 08/31/2022 69  39 - 117 U/L Final   • Total Bilirubin 08/31/2022 0.5  0.0 - 1.2 mg/dL Final   • Globulin 08/31/2022 2.6  gm/dL Final   • A/G Ratio 08/31/2022 1.7  g/dL Final   • BUN/Creatinine Ratio 08/31/2022 14.7  7.0 - 25.0 Final   • Anion Gap 08/31/2022 11.0  5.0 - 15.0 mmol/L Final   • eGFR 08/31/2022 87.2  >60.0 mL/min/1.73 Final    National Kidney Foundation and American Society of Nephrology (ASN) Task Force recommended calculation based on the Chronic Kidney Disease Epidemiology Collaboration (CKD-EPI) equation refit without adjustment for race.   •  "Hemoglobin A1C 08/31/2022 7.10 (H)  4.80 - 5.60 % Final   • Total Cholesterol 08/31/2022 121  0 - 200 mg/dL Final   • Triglycerides 08/31/2022 142  0 - 150 mg/dL Final   • HDL Cholesterol 08/31/2022 44  40 - 60 mg/dL Final   • LDL Cholesterol  08/31/2022 52  0 - 100 mg/dL Final   • VLDL Cholesterol 08/31/2022 25  5 - 40 mg/dL Final   • LDL/HDL Ratio 08/31/2022 1.10   Final   • TSH 08/31/2022 1.580  0.270 - 4.200 uIU/mL Final   • 25 Hydroxy, Vitamin D 08/31/2022 35.2  30.0 - 100.0 ng/ml Final   • Vitamin B-12 08/31/2022 1,184 (H)  211 - 946 pg/mL Final   • Free T4 08/31/2022 1.00  0.93 - 1.70 ng/dL Final      XR Shoulder 2+ View Right  Narrative: Three view right shoulder    HISTORY: Right shoulder pain. Chronic pain.    AP films with the humerus in internal and external rotation and  scapular Y view were obtained.    COMPARISON: None    FINDINGS:   Hypertrophic change acromioclavicular joint.  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  No fracture or dislocation.  No other osseous or articular abnormality.  Sternotomy.  Impression: CONCLUSION:  Hypertrophic change acromioclavicular joint.    [id number]    Electronically signed by:  Isaiah Hurley MD  3/19/2021 9:04 AM CDT  Workstation: [id number]    @[email or web address]@  Immunization History   Administered Date(s) Administered   • COVID-19 (MODERNA) 1st, 2nd, 3rd Dose Only 02/25/2021, 02/25/2021, 03/24/2021, 03/25/2021   • Fluzone High-Dose 65+yrs 12/30/2021   • Pneumococcal Polysaccharide (PPSV23) 03/05/2021   • Tdap 03/05/2021       The following portions of the patient's history were reviewed and updated as appropriate: allergies, current medications, past family history, past medical history, past social history, past surgical history and problem list.        Physical Exam  /70 (BP Location: Right arm, Patient Position: Sitting, Cuff Size: Adult)   Pulse 70   Temp 98.1 °F (36.7 °C)   Ht 172.7 cm (68\")   Wt 87.9 kg (193 lb 12.8 oz)   SpO2 99%   BMI " 29.47 kg/m²     Physical Exam  Vitals and nursing note reviewed.   Constitutional:       Appearance: He is well-developed. He is not diaphoretic.   HENT:      Head: Normocephalic and atraumatic.      Right Ear: External ear normal.   Eyes:      Conjunctiva/sclera: Conjunctivae normal.      Pupils: Pupils are equal, round, and reactive to light.   Cardiovascular:      Rate and Rhythm: Normal rate and regular rhythm.      Heart sounds: Normal heart sounds. No murmur heard.  Pulmonary:      Effort: Pulmonary effort is normal. No respiratory distress.      Breath sounds: Normal breath sounds.   Abdominal:      General: Bowel sounds are normal. There is no distension.      Palpations: Abdomen is soft.      Tenderness: There is no abdominal tenderness.   Musculoskeletal:         General: Tenderness present. No deformity. Normal range of motion.      Cervical back: Normal range of motion and neck supple.   Skin:     General: Skin is warm.      Coloration: Skin is not pale.      Findings: No erythema or rash.   Neurological:      Mental Status: He is alert and oriented to person, place, and time.      Cranial Nerves: No cranial nerve deficit.   Psychiatric:         Behavior: Behavior normal.         [unfilled]   Diagnosis Plan   1. Controlled type 2 diabetes mellitus with complication, without long-term current use of insulin ([facility])  CBC Auto Differential    Comprehensive Metabolic Panel    Hemoglobin A1c    Lipid Panel    Vitamin D,25-Hydroxy    Vitamin B12    Microalbumin / Creatinine Urine Ratio - Urine, Clean Catch      2. Mixed hyperlipidemia  CBC Auto Differential    Comprehensive Metabolic Panel    Hemoglobin A1c    Lipid Panel    Vitamin D,25-Hydroxy    Vitamin B12    Microalbumin / Creatinine Urine Ratio - Urine, Clean Catch      3. Coronary artery disease, unspecified vessel or lesion type, unspecified whether angina present, unspecified whether native or transplanted heart  CBC Auto Differential     Comprehensive Metabolic Panel    Hemoglobin A1c    Lipid Panel    Vitamin D,25-Hydroxy    Vitamin B12    Microalbumin / Creatinine Urine Ratio - Urine, Clean Catch      4. Overweight  CBC Auto Differential    Comprehensive Metabolic Panel    Hemoglobin A1c    Lipid Panel    Vitamin D,25-Hydroxy    Vitamin B12    Microalbumin / Creatinine Urine Ratio - Urine, Clean Catch      5. Vitamin D deficiency, unspecified   CBC Auto Differential    Comprehensive Metabolic Panel    Hemoglobin A1c    Lipid Panel    Vitamin D,25-Hydroxy    Vitamin B12    Microalbumin / Creatinine Urine Ratio - Urine, Clean Catch                   -recommend labwork  -recommend colonoscopy screening  -next colonoscopy in 2024   -recommend lung cancer screening - schedule CT of chest at imaging center   -recommend pneumonia vaccination   -recommend influenza vaccination  -recommend shingles vaccination   -right shoulder pain/Ac joint arthropathy - Orthopedic following   -constipation - was on  miralax 17 mg daily.  High fiber diet   -genital lesions/ulcer/genital herpes - referred  to Dermatology. Refilled valtrex for 500 mg PO BID for 10 days   -CAD sp CABG  X 4  Have records from [facility].  On aspirin crestor toprol XL   -HTN - on toprol XL 25 mg PO q daily.   -overweight - counseled weight loss >5 minutes BMI at 29.47    -DM type 2 on metformin 1000 mg PO q daily on farxiga 10 mg daily  -history of genital herpes -on valtrex  -HLP - on crestor 10 mg pO qhs start on Vascepa 2 grams PO BID   -advised pt to be safe and call with questions and concerns  -advised pt to go to ER or call 911 if symptoms worrisome or severe  -advised pt to followup with specialist and referrals  -advised pt to be safe during COVID-19 pandemic  .I spent 25  minutes caring for Juan on this date of service. This time includes time spent by me in the following activities: preparing for the visit, reviewing tests, obtaining and/or reviewing a separately obtained history,  performing a medically appropriate examination and/or evaluation, counseling and educating the patient/family/caregiver, ordering medications, tests, or procedures, referring and communicating with other health care professionals, documenting information in the medical record and care coordination.   -recheck in 3 months         This document has been electronically signed by Gage Means MD on November 30, 2022 11:29 CST              Answers for HPI/ROS submitted by the patient on 11/29/2022  What is the primary reason for your visit?: Other  Please describe your symptoms.: none  Have you had these symptoms before?: Yes  How long have you been having these symptoms?: Greater than 2 weeks

## 2022-11-30 ENCOUNTER — OFFICE VISIT (OUTPATIENT)
Dept: FAMILY MEDICINE CLINIC | Facility: CLINIC | Age: 67
End: 2022-11-30

## 2022-11-30 VITALS
BODY MASS INDEX: 29.37 KG/M2 | SYSTOLIC BLOOD PRESSURE: 132 MMHG | TEMPERATURE: 98.1 F | HEIGHT: 68 IN | DIASTOLIC BLOOD PRESSURE: 70 MMHG | WEIGHT: 193.8 LBS | OXYGEN SATURATION: 99 % | HEART RATE: 70 BPM

## 2022-11-30 DIAGNOSIS — E78.2 MIXED HYPERLIPIDEMIA: ICD-10-CM

## 2022-11-30 DIAGNOSIS — E11.8 CONTROLLED TYPE 2 DIABETES MELLITUS WITH COMPLICATION, WITHOUT LONG-TERM CURRENT USE OF INSULIN: Primary | ICD-10-CM

## 2022-11-30 DIAGNOSIS — E55.9 VITAMIN D DEFICIENCY, UNSPECIFIED: ICD-10-CM

## 2022-11-30 DIAGNOSIS — E66.3 OVERWEIGHT: ICD-10-CM

## 2022-11-30 DIAGNOSIS — I25.10 CORONARY ARTERY DISEASE, UNSPECIFIED VESSEL OR LESION TYPE, UNSPECIFIED WHETHER ANGINA PRESENT, UNSPECIFIED WHETHER NATIVE OR TRANSPLANTED HEART: ICD-10-CM

## 2022-11-30 PROCEDURE — 99214 OFFICE O/P EST MOD 30 MIN: CPT | Performed by: FAMILY MEDICINE

## 2022-12-05 ENCOUNTER — LAB (OUTPATIENT)
Dept: LAB | Facility: HOSPITAL | Age: 67
End: 2022-12-05

## 2022-12-05 DIAGNOSIS — E78.2 MIXED HYPERLIPIDEMIA: ICD-10-CM

## 2022-12-05 DIAGNOSIS — E66.3 OVERWEIGHT: ICD-10-CM

## 2022-12-05 DIAGNOSIS — E55.9 VITAMIN D DEFICIENCY, UNSPECIFIED: ICD-10-CM

## 2022-12-05 DIAGNOSIS — E11.8 CONTROLLED TYPE 2 DIABETES MELLITUS WITH COMPLICATION, WITHOUT LONG-TERM CURRENT USE OF INSULIN: ICD-10-CM

## 2022-12-05 DIAGNOSIS — I25.10 CORONARY ARTERY DISEASE, UNSPECIFIED VESSEL OR LESION TYPE, UNSPECIFIED WHETHER ANGINA PRESENT, UNSPECIFIED WHETHER NATIVE OR TRANSPLANTED HEART: ICD-10-CM

## 2022-12-05 LAB
25(OH)D3 SERPL-MCNC: 39.2 NG/ML (ref 30–100)
ALBUMIN SERPL-MCNC: 4.5 G/DL (ref 3.5–5.2)
ALBUMIN UR-MCNC: <1.2 MG/DL
ALBUMIN/GLOB SERPL: 1.9 G/DL
ALP SERPL-CCNC: 70 U/L (ref 39–117)
ALT SERPL W P-5'-P-CCNC: 17 U/L (ref 1–41)
ANION GAP SERPL CALCULATED.3IONS-SCNC: 9 MMOL/L (ref 5–15)
AST SERPL-CCNC: 17 U/L (ref 1–40)
BASOPHILS # BLD AUTO: 0.09 10*3/MM3 (ref 0–0.2)
BASOPHILS NFR BLD AUTO: 1 % (ref 0–1.5)
BILIRUB SERPL-MCNC: 0.4 MG/DL (ref 0–1.2)
BUN SERPL-MCNC: 15 MG/DL (ref 8–23)
BUN/CREAT SERPL: 12.6 (ref 7–25)
CALCIUM SPEC-SCNC: 9.4 MG/DL (ref 8.6–10.5)
CHLORIDE SERPL-SCNC: 103 MMOL/L (ref 98–107)
CHOLEST SERPL-MCNC: 106 MG/DL (ref 0–200)
CO2 SERPL-SCNC: 27 MMOL/L (ref 22–29)
CREAT SERPL-MCNC: 1.19 MG/DL (ref 0.76–1.27)
CREAT UR-MCNC: 98 MG/DL
DEPRECATED RDW RBC AUTO: 40.7 FL (ref 37–54)
EGFRCR SERPLBLD CKD-EPI 2021: 66.5 ML/MIN/1.73
EOSINOPHIL # BLD AUTO: 0.22 10*3/MM3 (ref 0–0.4)
EOSINOPHIL NFR BLD AUTO: 2.6 % (ref 0.3–6.2)
ERYTHROCYTE [DISTWIDTH] IN BLOOD BY AUTOMATED COUNT: 12.8 % (ref 12.3–15.4)
GLOBULIN UR ELPH-MCNC: 2.4 GM/DL
GLUCOSE SERPL-MCNC: 129 MG/DL (ref 65–99)
HCT VFR BLD AUTO: 46.9 % (ref 37.5–51)
HDLC SERPL-MCNC: 40 MG/DL (ref 40–60)
HGB BLD-MCNC: 16.6 G/DL (ref 13–17.7)
IMM GRANULOCYTES # BLD AUTO: 0.02 10*3/MM3 (ref 0–0.05)
IMM GRANULOCYTES NFR BLD AUTO: 0.2 % (ref 0–0.5)
LDLC SERPL CALC-MCNC: 41 MG/DL (ref 0–100)
LDLC/HDLC SERPL: 0.91 {RATIO}
LYMPHOCYTES # BLD AUTO: 4.12 10*3/MM3 (ref 0.7–3.1)
LYMPHOCYTES NFR BLD AUTO: 47.9 % (ref 19.6–45.3)
MCH RBC QN AUTO: 31 PG (ref 26.6–33)
MCHC RBC AUTO-ENTMCNC: 35.4 G/DL (ref 31.5–35.7)
MCV RBC AUTO: 87.5 FL (ref 79–97)
MICROALBUMIN/CREAT UR: NORMAL MG/G{CREAT}
MONOCYTES # BLD AUTO: 0.57 10*3/MM3 (ref 0.1–0.9)
MONOCYTES NFR BLD AUTO: 6.6 % (ref 5–12)
NEUTROPHILS NFR BLD AUTO: 3.58 10*3/MM3 (ref 1.7–7)
NEUTROPHILS NFR BLD AUTO: 41.7 % (ref 42.7–76)
NRBC BLD AUTO-RTO: 0 /100 WBC (ref 0–0.2)
PLATELET # BLD AUTO: 189 10*3/MM3 (ref 140–450)
PMV BLD AUTO: 10.8 FL (ref 6–12)
POTASSIUM SERPL-SCNC: 4.3 MMOL/L (ref 3.5–5.2)
PROT SERPL-MCNC: 6.9 G/DL (ref 6–8.5)
RBC # BLD AUTO: 5.36 10*6/MM3 (ref 4.14–5.8)
SODIUM SERPL-SCNC: 139 MMOL/L (ref 136–145)
TRIGL SERPL-MCNC: 149 MG/DL (ref 0–150)
VIT B12 BLD-MCNC: 500 PG/ML (ref 211–946)
VLDLC SERPL-MCNC: 25 MG/DL (ref 5–40)
WBC NRBC COR # BLD: 8.6 10*3/MM3 (ref 3.4–10.8)

## 2022-12-05 PROCEDURE — 80053 COMPREHEN METABOLIC PANEL: CPT

## 2022-12-05 PROCEDURE — 82043 UR ALBUMIN QUANTITATIVE: CPT

## 2022-12-05 PROCEDURE — 80061 LIPID PANEL: CPT

## 2022-12-05 PROCEDURE — 85025 COMPLETE CBC W/AUTO DIFF WBC: CPT

## 2022-12-05 PROCEDURE — 82570 ASSAY OF URINE CREATININE: CPT

## 2022-12-05 PROCEDURE — 82306 VITAMIN D 25 HYDROXY: CPT

## 2022-12-05 PROCEDURE — 82607 VITAMIN B-12: CPT

## 2022-12-05 PROCEDURE — 83036 HEMOGLOBIN GLYCOSYLATED A1C: CPT

## 2022-12-06 LAB — HBA1C MFR BLD: 6.6 % (ref 4.8–5.6)

## 2022-12-08 ENCOUNTER — OFFICE VISIT (OUTPATIENT)
Dept: CARDIOLOGY | Facility: CLINIC | Age: 67
End: 2022-12-08

## 2022-12-08 VITALS
HEIGHT: 68 IN | OXYGEN SATURATION: 99 % | BODY MASS INDEX: 29.31 KG/M2 | DIASTOLIC BLOOD PRESSURE: 80 MMHG | SYSTOLIC BLOOD PRESSURE: 128 MMHG | HEART RATE: 57 BPM | WEIGHT: 193.4 LBS

## 2022-12-08 DIAGNOSIS — Z95.1 S/P CABG (CORONARY ARTERY BYPASS GRAFT): ICD-10-CM

## 2022-12-08 DIAGNOSIS — E78.2 MIXED HYPERLIPIDEMIA: ICD-10-CM

## 2022-12-08 DIAGNOSIS — E11.8 CONTROLLED TYPE 2 DIABETES MELLITUS WITH COMPLICATION, WITHOUT LONG-TERM CURRENT USE OF INSULIN: ICD-10-CM

## 2022-12-08 DIAGNOSIS — I10 ESSENTIAL HYPERTENSION: ICD-10-CM

## 2022-12-08 DIAGNOSIS — I25.10 CORONARY ARTERY DISEASE, UNSPECIFIED VESSEL OR LESION TYPE, UNSPECIFIED WHETHER ANGINA PRESENT, UNSPECIFIED WHETHER NATIVE OR TRANSPLANTED HEART: Primary | ICD-10-CM

## 2022-12-08 LAB
QT INTERVAL: 412 MS
QTC INTERVAL: 401 MS

## 2022-12-08 PROCEDURE — 99204 OFFICE O/P NEW MOD 45 MIN: CPT | Performed by: INTERNAL MEDICINE

## 2022-12-08 PROCEDURE — 93000 ELECTROCARDIOGRAM COMPLETE: CPT | Performed by: INTERNAL MEDICINE

## 2022-12-08 NOTE — PROGRESS NOTES
Malcolm Cheng  67 y.o. male    12/08/2022  1. Coronary artery disease, unspecified vessel or lesion type, unspecified whether angina present, unspecified whether native or transplanted heart    2. S/P CABG (coronary artery bypass graft)    3. Mixed hyperlipidemia    4. Essential hypertension    5. Controlled type 2 diabetes mellitus with complication, without long-term current use of insulin (HCC)        History of Present Illness:  Malcolm Cheng is a 67-year-old male who is being seen by me for the first time as referred by .  His history is remarkable for coronary artery disease status post CABG in 2018, hypertension, diabetes mellitus.    His CABG was at [facility] following an abnormal stress test which led to cardiac catheterization which showed multivessel coronary artery disease.  He underwent LIMA to LAD, SVG to diagonal, SVG to OM and SVG to PDA and TMR in January 2018.  He apparently received 4 bypass grafts the details of which are not available to me.  This will be requested.  The patient has progressed well since the procedure and denied any chest pain, shortness of breath or palpitation at this time.  He has been compliant with his medications.  He is able to perform his activities of daily living.    EKG showed sinus rhythm with heart rate of 57 bpm.  Old inferior wall myocardial infarction.  Nonspecific T wave changes anterolateral leads.    SUBJECTIVE    No Known Allergies      Past Medical History:   Diagnosis Date   • Diabetes mellitus (HCC)    • Hyperlipidemia          Past Surgical History:   Procedure Laterality Date   • APPENDECTOMY     • CARDIAC SURGERY     • CHOLECYSTECTOMY     • COLONOSCOPY N/A 5/3/2021    Procedure: COLONOSCOPY a month out;  Surgeon: Hector Dennison MD;  Location: [facility] ENDOSCOPY;  Service: Gastroenterology;  Laterality: N/A;         Family History   Problem Relation Age of Onset   • Heart disease Father    • Alcohol abuse Father    •  "Cancer Brother          Social History     Socioeconomic History   • Marital status:    Tobacco Use   • Smoking status: Former     Types: Cigarettes     Quit date: 2007     Years since quitting: 15.9   • Smokeless tobacco: Never   Vaping Use   • Vaping Use: Never used   Substance and Sexual Activity   • Alcohol use: Not Currently   • Drug use: Never   • Sexual activity: Defer         Current Outpatient Medications   Medication Sig Dispense Refill   • aspirin 81 MG EC tablet Take 81 mg by mouth Daily.     • dapagliflozin Propanediol (Farxiga) 10 MG tablet Take 10 mg by mouth Daily. 90 tablet 3   • metFORMIN (GLUCOPHAGE) 1000 MG tablet Take 1 tablet by mouth 2 (Two) Times a Day With Meals. 180 tablet 1   • metoprolol succinate XL (TOPROL-XL) 25 MG 24 hr tablet Take 1 tablet by mouth Daily. 90 tablet 1   • Omega-3 Fatty Acids (fish oil) 1000 MG capsule capsule Take 1,000 mg by mouth.     • rosuvastatin (CRESTOR) 10 MG tablet Take 1 tablet by mouth Every Night. 90 tablet 1   • valACYclovir (VALTREX) 500 MG tablet Take 1 tablet by mouth Daily. (Patient taking differently: Take 500 mg by mouth As Needed.) 90 tablet 1   • vitamin B-12 (CYANOCOBALAMIN) 500 MCG tablet Take 1 tablet by mouth Daily. 30 tablet 3   • vitamin D (ERGOCALCIFEROL) 1.25 MG (60218 UT) capsule capsule Take 1 capsule by mouth Every 7 (Seven) Days. 4 capsule 3     No current facility-administered medications for this visit.         OBJECTIVE    /80 (BP Location: Left arm, Patient Position: Sitting, Cuff Size: Adult)   Pulse 57   Ht 172.7 cm (68\")   Wt 87.7 kg (193 lb 6.4 oz)   SpO2 99%   BMI 29.41 kg/m²         Review of Systems     Constitutional:  Denies recent weight loss, weight gain, fever or chills, no change in exercise tolerance     HENT:  Denies any hearing loss, epistaxis, hoarseness, or difficulty speaking.     Eyes: Wears eyeglasses or contact lenses     Respiratory:  Denies dyspnea with exertion, no cough, wheezing, or " hemoptysis.     Cardiovascular: Negative for palpitations, chest pain, orthopnea, PND, peripheral edema, syncope, or claudication.     Gastrointestinal:  Denies change in bowel habits, dyspepsia, ulcer disease, hematochezia, or melena.     Endocrine: Negative for cold intolerance, heat intolerance, polydipsia, polyphagia and polyuria.     Genitourinary: History of genital herpes      Musculoskeletal: DJD    Skin:  Denies any change in hair or nails, rashes, or skin lesions.     Allergic/Immunologic: Negative.  Negative for environmental allergies, food allergies and/or immunocompromised state.     Neurological:  Denies any history of recurrent headaches, strokes, TIA, or seizure disorder.     Hematological: Denies any food allergies, seasonal allergies, bleeding disorders, or lymphadenopathy.     Psychiatric/Behavioral: Denies any history of depression, substance abuse, or change in cognitive function.         Physical Exam     Constitutional: Cooperative, alert and oriented, well-developed, well-nourished, in no acute distress.     HENT:   Head: Normocephalic, normal hair patterns, no masses or tenderness.  Ears, Nose, and Throat: No gross abnormalities. No pallor or cyanosis. Dentition good.   Eyes: EOMS intact, PERRL, conjunctivae and lids unremarkable. Fundoscopic exam and visual fields not performed.   Neck: No palpable masses or adenopathy, no thyromegaly, no JVD, carotid pulses are full and equal bilaterally and without bruit.     Cardiovascular: Regular rhythm, S1 and S2 normal, no S3 or S4.  No murmurs, gallops, or rubs detected.     Pulmonary/Chest: Chest: normal symmetry, normal respiratory excursion, no intercostal retraction, no use of accessory muscles.     Pulmonary: Normal breath sounds. No rales or rhonchi.    Abdominal: Abdomen soft, bowel sounds normoactive, no masses, no hepatosplenomegaly, nontender, no bruit.     Musculoskeletal: No deformities, clubbing, cyanosis, erythema, or edema  observed.    Neurological: No gross motor or sensory deficits noted, affect appropriate, oriented to time, person, place.     Skin: Warm and dry to the touch, no apparent skin lesion or mass noted.     Psychiatric: He has a normal mood and affect. His behavior is normal. Judgment and thought content normal.         Procedures      Lab Results   Component Value Date    WBC 8.60 12/05/2022    HGB 16.6 12/05/2022    HCT 46.9 12/05/2022    MCV 87.5 12/05/2022     12/05/2022     Lab Results   Component Value Date    GLUCOSE 129 (H) 12/05/2022    BUN 15 12/05/2022    CREATININE 1.19 12/05/2022    EGFRIFNONA 82 10/13/2021    BCR 12.6 12/05/2022    CO2 27.0 12/05/2022    CALCIUM 9.4 12/05/2022    ALBUMIN 4.50 12/05/2022    AST 17 12/05/2022    ALT 17 12/05/2022     Lab Results   Component Value Date    CHOL 106 12/05/2022    CHOL 121 08/31/2022    CHOL 149 10/13/2021     Lab Results   Component Value Date    TRIG 149 12/05/2022    TRIG 142 08/31/2022    TRIG 137 10/13/2021     Lab Results   Component Value Date    HDL 40 12/05/2022    HDL 44 08/31/2022    HDL 53 10/13/2021     No components found for: LDLCALC  Lab Results   Component Value Date    LDL 41 12/05/2022    LDL 52 08/31/2022    LDL 72 10/13/2021     No results found for: HDLLDLRATIO  No components found for: CHOLHDL  Lab Results   Component Value Date    HGBA1C 6.60 (H) 12/05/2022     Lab Results   Component Value Date    TSH 1.580 08/31/2022           ASSESSMENT AND PLAN  Malcolm Cheng is a 67-year-old male with known coronary artery disease status post CABG in January 2018 as described under history of present illness.  His other medical issues include hypertension, diabetes mellitus, hyperlipidemia.  He is asymptomatic at the current time with no evidence of angina, arrhythmia or congestive heart failure.  His hemoglobin A1c was 6.6 in December 5, 2022 and LDL was 41 and HDL 40.  CBC and CMP were within acceptable normal limits.    Since he is about 5  years post CABG I believe that assessment of progress/restratification would be reasonable.  An echocardiogram to assess left ventricular and valvular function and an exercise Cardiolite stress test to assess myocardial perfusion has been arranged.  I have continued antiplatelet therapy with aspirin, low-dose metoprolol succinate 25 mg daily and lipid-lowering therapy with Crestor.    Thank you for asking me to see this patient.    Diagnoses and all orders for this visit:    1. Coronary artery disease, unspecified vessel or lesion type, unspecified whether angina present, unspecified whether native or transplanted heart (Primary)  -     ECG 12 Lead  -     Stress Test With Myocardial Perfusion One Day; Future  -     Adult Transthoracic Echo Complete w/ Color, Spectral and Contrast if Necessary Per Protocol; Future    2. S/P CABG (coronary artery bypass graft)  -     Stress Test With Myocardial Perfusion One Day; Future  -     Adult Transthoracic Echo Complete w/ Color, Spectral and Contrast if Necessary Per Protocol; Future    3. Mixed hyperlipidemia  -     Stress Test With Myocardial Perfusion One Day; Future  -     Adult Transthoracic Echo Complete w/ Color, Spectral and Contrast if Necessary Per Protocol; Future    4. Essential hypertension  -     Stress Test With Myocardial Perfusion One Day; Future  -     Adult Transthoracic Echo Complete w/ Color, Spectral and Contrast if Necessary Per Protocol; Future    5. Controlled type 2 diabetes mellitus with complication, without long-term current use of insulin (HCC)        BMI is >= 25 and <30. (Overweight) The following options were offered after discussion;: exercise counseling/recommendations      Malcolm Cheng  reports that he quit smoking about 15 years ago. His smoking use included cigarettes. He has never used smokeless tobacco..              Eric Summers MD  12/8/2022  09:45 CST

## 2022-12-19 ENCOUNTER — TELEPHONE (OUTPATIENT)
Dept: FAMILY MEDICINE CLINIC | Facility: CLINIC | Age: 67
End: 2022-12-19

## 2022-12-19 RX ORDER — DAPAGLIFLOZIN 10 MG/1
1 TABLET, FILM COATED ORAL DAILY
Qty: 90 TABLET | Refills: 3 | Status: SHIPPED | OUTPATIENT
Start: 2022-12-19

## 2022-12-19 NOTE — TELEPHONE ENCOUNTER
Patients wife would like a call back regarding the letter that they received about the dapagliflozin Propanediol (Farxiga) 10 MG tablet  They are also needing some samples if possible he took his last one yesterday morning please call [phone]

## 2022-12-19 NOTE — TELEPHONE ENCOUNTER
Rx Refill Note  Requested Prescriptions     Pending Prescriptions Disp Refills   • dapagliflozin Propanediol (Farxiga) 10 MG tablet 90 tablet 3     Sig: Take 10 mg by mouth Daily.      Last office visit with prescribing clinician: 11/30/2022   Last telemedicine visit with prescribing clinician: 2/28/2023   Next office visit with prescribing clinician: 2/28/2023   {TIP  Encounters:  PT WIFE STATED THAT PT IS ALREADY APPROVED FOR PT ASSISTANCE FOR HIS FARXIGA FOR 2023 BUT THEY NEED A NEW PRESCRIPTION SENT TO THE COMPANY. [facility] NOW ACCEPTS NEW PRESCRIPTIONS FOR PT ASSISTANCE TO BE ESCRIBED TO [facility] IN [place], [place]. RX SENT.   {TIP  Please add Last Relevant Lab Date if appropriate             {TIP  Is Refill Pharmacy correct? YES    Would you like a call back once the refill request has been completed: [] Yes [] No    If the office needs to give you a call back, can they leave a voicemail: [] Yes [] No    Tristin Joyce LPN  12/19/22, 14:31 CST

## 2022-12-20 NOTE — TELEPHONE ENCOUNTER
Pt wife notified that Rx for Farxiga pt assistance was sent electronically to [facility] pharmacy. Also that she is welcome to  some samples.

## 2022-12-21 ENCOUNTER — HOSPITAL ENCOUNTER (OUTPATIENT)
Dept: CARDIOLOGY | Facility: HOSPITAL | Age: 67
Discharge: HOME | End: 2022-12-21

## 2022-12-21 ENCOUNTER — HOSPITAL ENCOUNTER (OUTPATIENT)
Dept: NUCLEAR MEDICINE | Facility: HOSPITAL | Age: 67
Discharge: HOME | End: 2022-12-21

## 2022-12-21 DIAGNOSIS — Z95.1 S/P CABG (CORONARY ARTERY BYPASS GRAFT): ICD-10-CM

## 2022-12-21 DIAGNOSIS — I10 ESSENTIAL HYPERTENSION: ICD-10-CM

## 2022-12-21 DIAGNOSIS — E78.2 MIXED HYPERLIPIDEMIA: ICD-10-CM

## 2022-12-21 DIAGNOSIS — I25.10 CORONARY ARTERY DISEASE, UNSPECIFIED VESSEL OR LESION TYPE, UNSPECIFIED WHETHER ANGINA PRESENT, UNSPECIFIED WHETHER NATIVE OR TRANSPLANTED HEART: ICD-10-CM

## 2022-12-21 LAB
BH CV REST NUCLEAR ISOTOPE DOSE: 10.5 MCI
BH CV STRESS BP STAGE 1: NORMAL
BH CV STRESS BP STAGE 3: NORMAL
BH CV STRESS COMMENTS STAGE 1: NORMAL
BH CV STRESS DOSE REGADENOSON STAGE 1: 0.4
BH CV STRESS DURATION MIN STAGE 1: 3
BH CV STRESS DURATION MIN STAGE 3: 1
BH CV STRESS DURATION SEC STAGE 1: 0
BH CV STRESS DURATION SEC STAGE 2: 11
BH CV STRESS DURATION SEC STAGE 3: 22
BH CV STRESS GRADE STAGE 1: 10
BH CV STRESS GRADE STAGE 2: 12
BH CV STRESS GRADE STAGE 3: 14
BH CV STRESS HR STAGE 1: 122
BH CV STRESS HR STAGE 2: 122
BH CV STRESS HR STAGE 3: 118
BH CV STRESS METS STAGE 1: 5
BH CV STRESS METS STAGE 2: 7.5
BH CV STRESS METS STAGE 3: 10
BH CV STRESS NUCLEAR ISOTOPE DOSE: 35.6 MCI
BH CV STRESS PROTOCOL 1: NORMAL
BH CV STRESS RECOVERY BP: NORMAL MMHG
BH CV STRESS RECOVERY HR: 75 BPM
BH CV STRESS SPEED STAGE 1: 1.7
BH CV STRESS SPEED STAGE 2: 2.5
BH CV STRESS SPEED STAGE 3: 3.4
BH CV STRESS STAGE 1: 1
BH CV STRESS STAGE 2: 2
BH CV STRESS STAGE 3: 3
LV EF NUC BP: 67 %
MAXIMAL PREDICTED HEART RATE: 152 BPM
PERCENT MAX PREDICTED HR: 101.97 %
STRESS BASELINE BP: NORMAL MMHG
STRESS BASELINE HR: 71 BPM
STRESS PERCENT HR: 120 %
STRESS POST EXERCISE DUR MIN: 4 MIN
STRESS POST EXERCISE DUR SEC: 31 SEC
STRESS POST PEAK BP: NORMAL MMHG
STRESS POST PEAK HR: 155 BPM
STRESS TARGET HR: 129 BPM

## 2022-12-21 PROCEDURE — 78452 HT MUSCLE IMAGE SPECT MULT: CPT | Performed by: INTERNAL MEDICINE

## 2022-12-21 PROCEDURE — A9500 TC99M SESTAMIBI: HCPCS | Performed by: INTERNAL MEDICINE

## 2022-12-21 PROCEDURE — 78452 HT MUSCLE IMAGE SPECT MULT: CPT

## 2022-12-21 PROCEDURE — 93018 CV STRESS TEST I&R ONLY: CPT | Performed by: INTERNAL MEDICINE

## 2022-12-21 PROCEDURE — 93017 CV STRESS TEST TRACING ONLY: CPT

## 2022-12-21 PROCEDURE — 0 TECHNETIUM SESTAMIBI: Performed by: INTERNAL MEDICINE

## 2022-12-21 RX ADMIN — TECHNETIUM TC 99M SESTAMIBI 1 DOSE: 1 INJECTION INTRAVENOUS at 09:06

## 2022-12-21 RX ADMIN — TECHNETIUM TC 99M SESTAMIBI 1 DOSE: 1 INJECTION INTRAVENOUS at 07:47

## 2023-02-07 NOTE — PROGRESS NOTES
Subjective:  Malcolm Cheng is a 67 y.o. male who presents for       Patient Active Problem List   Diagnosis   • Coronary artery disease   • Vitamin D deficiency, unspecified    • Controlled type 2 diabetes mellitus with complication, without long-term current use of insulin (HCC)   • Mixed hyperlipidemia   • Overweight   • Yeast infection   • Herpes infection   • Right shoulder pain   • Heart disease   • Encounter for colonoscopy due to history of colonic polyp   • Recurrent genital herpes simplex   • S/P CABG (coronary artery bypass graft)   • Essential hypertension   • Stage 2 chronic kidney disease           Current Outpatient Medications:   •  aspirin 81 MG EC tablet, Take 1 tablet by mouth Daily., Disp: , Rfl:   •  dapagliflozin Propanediol (Farxiga) 10 MG tablet, Take 10 mg by mouth Daily., Disp: 90 tablet, Rfl: 3  •  metFORMIN (GLUCOPHAGE) 1000 MG tablet, Take 1 tablet by mouth 2 (Two) Times a Day With Meals., Disp: 180 tablet, Rfl: 1  •  metoprolol succinate XL (TOPROL-XL) 25 MG 24 hr tablet, Take 1 tablet by mouth Daily., Disp: 90 tablet, Rfl: 1  •  Omega-3 Fatty Acids (fish oil) 1000 MG capsule capsule, Take 1 capsule by mouth., Disp: , Rfl:   •  rosuvastatin (CRESTOR) 10 MG tablet, Take 1 tablet by mouth Every Night., Disp: 90 tablet, Rfl: 1  •  valACYclovir (VALTREX) 500 MG tablet, Take 1 tablet by mouth Daily. (Patient taking differently: Take 1 tablet by mouth As Needed.), Disp: 90 tablet, Rfl: 1  •  vitamin D (ERGOCALCIFEROL) 1.25 MG (25730 UT) capsule capsule, Take 1 capsule by mouth Every 7 (Seven) Days., Disp: 4 capsule, Rfl: 3    HPI          Pt is 66 yo male with management of being overweight  DM Type 2, HTN, HLP, history of genital herpes, CAD sp CABG x 4, history of colonic polyps, herpes type 1 and 2 positive, history of colonic polyps , CKD stage 2        8/30/22 in office visit for recheck. Pt is due for new labwork. He is now retired. He is doing well on medicationn. No chest  pain no dizziness. He continues to see Cardiologist in [place]. Pt denies any chest pain no dizziness. Breathing stable     11/30/22 in office visit for recheck. Pt had labwork done on 8/31/22 that showed high vitamin B12. hga1c was at 7.10. from 6.90. CMP showed glucose at 122 GFR at 87.2 CBC showed stable hemoglobin and WBC. His AAA screening recently was negative. Also his recent CT of chest lung cancer screening showed no suspicious nodules; pt is doing well. BP is stable . Breathing stable.      2/28/23 in office visit for recheck. Pt saw Cardiology on 12/8/22 for his CAD sp CABG and stress test and echo was ordered. His recent stress test showed changes form his documented CAD and previous bypass surgeyr no new areas of iscehmia pt had labwork on 12/5/22 that showed stable vitamin D and b12 lipid panel was stable with LDL at 41. hga1c is at 6.60 from 7.10. CMP showed GFR at 66.5 from 87.2. CBC showed normal hemoglobin and WBC. Pt is doing well overall no chest pain no dizziness. Breathing stable. Sugars have better      Diabetes  He presents for his initial diabetic visit. He has type 2 diabetes mellitus. His disease course has been stable. Pertinent negatives for hypoglycemia include no confusion, dizziness, headaches, hunger, mood changes, nervousness/anxiousness, pallor, seizures, sleepiness, speech difficulty, sweats or tremors. Pertinent negatives for diabetes include no blurred vision, no chest pain, no fatigue, no foot paresthesias, no foot ulcerations, no polydipsia, no polyphagia, no polyuria, no visual change, no weakness and no weight loss. Pertinent negatives for hypoglycemia complications include no blackouts, no hospitalization, no nocturnal hypoglycemia, no required assistance and no required glucagon injection. Symptoms are stable. Pertinent negatives for diabetic complications include no autonomic neuropathy, CVA, heart disease, impotence, nephropathy, PVD or retinopathy. Risk factors for  coronary artery disease include diabetes mellitus, dyslipidemia, hypertension and sedentary lifestyle. Current diabetic treatment includes oral agent (dual therapy). He is compliant with treatment most of the time. His weight is stable. He is following a generally healthy diet. He has not had a previous visit with a dietitian. He participates in exercise intermittently. An ACE inhibitor/angiotensin II receptor blocker is not being taken. He does not see a podiatrist.Eye exam is not current.   Hypertension  This is a recurrent problem. The current episode started more than 1 month ago. The problem is unchanged. The problem is controlled. Pertinent negatives include no blurred vision, chest pain, headaches, shortness of breath () or sweats. Risk factors for coronary artery disease include diabetes mellitus, dyslipidemia, male gender and sedentary lifestyle. Past treatments include beta blockers. Current antihypertension treatment includes beta blockers. The current treatment provides significant improvement. There are no compliance problems.  There is no history of angina, kidney disease, CAD/MI, CVA, heart failure, left ventricular hypertrophy, PVD or retinopathy. There is no history of chronic renal disease, coarctation of the aorta, hyperaldosteronism, hypercortisolism, hyperparathyroidism, a hypertension causing med, pheochromocytoma, renovascular disease, sleep apnea or a thyroid problem.   Hyperlipidemia  This is a recurrent problem. The current episode started more than 1 month ago. The problem is controlled. Recent lipid tests were reviewed and are variable. He has no history of chronic renal disease, diabetes, hypothyroidism, liver disease, obesity or nephrotic syndrome. Pertinent negatives include no chest pain or shortness of breath (). Current antihyperlipidemic treatment includes statins. The current treatment provides moderate improvement of lipids. Risk factors for coronary artery disease include male  sex, dyslipidemia and diabetes mellitus.     Review of Systems  Review of Systems   Constitutional: Positive for activity change and fatigue. Negative for appetite change, chills, diaphoresis and fever.   HENT: Negative for congestion, postnasal drip, rhinorrhea, sinus pressure, sinus pain, sneezing, sore throat, trouble swallowing and voice change.    Respiratory: Negative for cough, choking, chest tightness, shortness of breath, wheezing and stridor.    Cardiovascular: Negative for chest pain.   Gastrointestinal: Negative for diarrhea, nausea and vomiting.   Musculoskeletal: Positive for arthralgias.   Neurological: Positive for weakness and numbness. Negative for headaches.       Patient Active Problem List   Diagnosis   • Coronary artery disease   • Vitamin D deficiency, unspecified    • Controlled type 2 diabetes mellitus with complication, without long-term current use of insulin (HCC)   • Mixed hyperlipidemia   • Overweight   • Yeast infection   • Herpes infection   • Right shoulder pain   • Heart disease   • Encounter for colonoscopy due to history of colonic polyp   • Recurrent genital herpes simplex   • S/P CABG (coronary artery bypass graft)   • Essential hypertension   • Stage 2 chronic kidney disease     Past Surgical History:   Procedure Laterality Date   • APPENDECTOMY     • CARDIAC SURGERY     • CHOLECYSTECTOMY     • COLONOSCOPY N/A 5/3/2021    Procedure: COLONOSCOPY a month out;  Surgeon: Hector Dennison MD;  Location: [facility] ENDOSCOPY;  Service: Gastroenterology;  Laterality: N/A;     Social History     Socioeconomic History   • Marital status:    Tobacco Use   • Smoking status: Former     Types: Cigarettes     Quit date:      Years since quittin.1   • Smokeless tobacco: Never   Vaping Use   • Vaping Use: Never used   Substance and Sexual Activity   • Alcohol use: Not Currently   • Drug use: Never   • Sexual activity: Defer     Family History   Problem Relation Age of Onset   •  Heart disease Father    • Alcohol abuse Father    • Cancer Brother      Ancillary Procedure on 02/27/2023   Component Date Value Ref Range Status   • Target HR (85%) 02/27/2023 129  bpm Final   • Max. Pred. HR (100%) 02/27/2023 152  bpm Final   • EF(MOD-bp) 02/27/2023 60.0  % Final   • LVOT area 02/27/2023 4.4  cm2 Final   • LVOT diam 02/27/2023 2.37  cm Final   • EDV(MOD-sp2) 02/27/2023 63.4  ml Final   • EDV(MOD-sp4) 02/27/2023 76.1  ml Final   • ESV(MOD-sp2) 02/27/2023 24.0  ml Final   • ESV(MOD-sp4) 02/27/2023 29.8  ml Final   • SV(MOD-sp2) 02/27/2023 39.4  ml Final   • SV(MOD-sp4) 02/27/2023 46.3  ml Final   • EF(MOD-sp2) 02/27/2023 62.1  % Final   • EF(MOD-sp4) 02/27/2023 60.8  % Final   • MV E max syed 02/27/2023 72.3  cm/sec Final   • MV A max syed 02/27/2023 71.3  cm/sec Final   • MV dec time 02/27/2023 0.29  msec Final   • MV E/A 02/27/2023 1.01   Final   • Med Peak E' Syed 02/27/2023 7.5  cm/sec Final   • Lat Peak E' Syed 02/27/2023 13.1  cm/sec Final   • Avg E/e' ratio 02/27/2023 7.02   Final   • SV(LVOT) 02/27/2023 85.6  ml Final   • TAPSE (>1.6) 02/27/2023 2.49  cm Final   • LV V1 max 02/27/2023 90.7  cm/sec Final   • LV V1 max PG 02/27/2023 3.3  mmHg Final   • LV V1 mean PG 02/27/2023 1.64  mmHg Final   • LV V1 VTI 02/27/2023 19.4  cm Final   • Ao pk syed 02/27/2023 87.8  cm/sec Final   • Ao max PG 02/27/2023 3.1  mmHg Final   • Ao mean PG 02/27/2023 1.63  mmHg Final   • Ao V2 VTI 02/27/2023 18.3  cm Final   • NATALIA(I,D) 02/27/2023 4.7  cm2 Final   • MV max PG 02/27/2023 3.3  mmHg Final   • MV mean PG 02/27/2023 1.10  mmHg Final   • MV V2 VTI 02/27/2023 30.3  cm Final   • MVA(VTI) 02/27/2023 2.8  cm2 Final   • MV dec slope 02/27/2023 249.0  cm/sec2 Final   • MR max syed 02/27/2023 398.8  cm/sec Final   • MR max PG 02/27/2023 63.6  mmHg Final   • TR max syed 02/27/2023 208.6  cm/sec Final   • TR max PG 02/27/2023 17.4  mmHg Final   • RVSP(TR) 02/27/2023 20.4  mmHg Final   • RAP systole 02/27/2023 3.0  mmHg  Final   • RV V1 max PG 02/27/2023 2.12  mmHg Final   • RV V1 max 02/27/2023 72.8  cm/sec Final   • RV V1 VTI 02/27/2023 16.9  cm Final   • PA V2 max 02/27/2023 96.2  cm/sec Final   • Ao root diam 02/27/2023 4.0  cm Final   • ACS 02/27/2023 1.69  cm Final   • Sinus 02/27/2023 3.5  cm Final   Hospital Outpatient Visit on 12/21/2022   Component Date Value Ref Range Status   • Target HR (85%) 12/21/2022 129  bpm Final   • Max. Pred. HR (100%) 12/21/2022 152  bpm Final   • BH CV STRESS PROTOCOL 1 12/21/2022 Michael   Final   • Stage 1 12/21/2022 1   Final   • HR Stage 1 12/21/2022 122   Final   • BP Stage 1 12/21/2022 135/97   Final   • Duration Min Stage 1 12/21/2022 3   Final   • Duration Sec Stage 1 12/21/2022 0   Final   • Grade Stage 1 12/21/2022 10   Final   • Speed Stage 1 12/21/2022 1.7   Final   • BH CV STRESS METS STAGE 1 12/21/2022 5   Final   • Stress Dose Regadenoson Stage 1 12/21/2022 0.4   Final   • Stress Comments Stage 1 12/21/2022 10 sec bolus injection   Final   • Stage 2 12/21/2022 2   Final   • HR Stage 2 12/21/2022 122   Final   • Duration Sec Stage 2 12/21/2022 11   Final   • Grade Stage 2 12/21/2022 12   Final   • Speed Stage 2 12/21/2022 2.5   Final   • BH CV STRESS METS STAGE 2 12/21/2022 7.5   Final   • Stage 3 12/21/2022 3   Final   • HR Stage 3 12/21/2022 118   Final   • BP Stage 3 12/21/2022 146/87   Final   • Duration Min Stage 3 12/21/2022 1   Final   • Duration Sec Stage 3 12/21/2022 22   Final   • Grade Stage 3 12/21/2022 14   Final   • Speed Stage 3 12/21/2022 3.4   Final   • BH CV STRESS METS STAGE 3 12/21/2022 10.0   Final   • Baseline HR 12/21/2022 71  bpm Final   • Baseline BP 12/21/2022 158/104  mmHg Final   • Peak HR 12/21/2022 155  bpm Final   • Percent Max Pred HR 12/21/2022 101.97  % Final   • Percent Target HR 12/21/2022 120  % Final   • Peak BP 12/21/2022 196/91  mmHg Final   • Recovery HR 12/21/2022 75  bpm Final   • Recovery BP 12/21/2022 150/94  mmHg Final   • Exercise  duration (min) 12/21/2022 4  min Final   • Exercise duration (sec) 12/21/2022 31  sec Final   • BH CV REST NUCLEAR ISOTOPE DOSE 12/21/2022 10.5  mCi Final   • BH CV STRESS NUCLEAR ISOTOPE DOSE 12/21/2022 35.6  mCi Final   • Nuc Stress EF 12/21/2022 67  % Final   Office Visit on 12/08/2022   Component Date Value Ref Range Status   • QT Interval 12/08/2022 412  ms Final   • QTC Interval 12/08/2022 401  ms Final   Lab on 12/05/2022   Component Date Value Ref Range Status   • WBC 12/05/2022 8.60  3.40 - 10.80 10*3/mm3 Final   • RBC 12/05/2022 5.36  4.14 - 5.80 10*6/mm3 Final   • Hemoglobin 12/05/2022 16.6  13.0 - 17.7 g/dL Final   • Hematocrit 12/05/2022 46.9  37.5 - 51.0 % Final   • MCV 12/05/2022 87.5  79.0 - 97.0 fL Final   • MCH 12/05/2022 31.0  26.6 - 33.0 pg Final   • MCHC 12/05/2022 35.4  31.5 - 35.7 g/dL Final   • RDW 12/05/2022 12.8  12.3 - 15.4 % Final   • RDW-SD 12/05/2022 40.7  37.0 - 54.0 fl Final   • MPV 12/05/2022 10.8  6.0 - 12.0 fL Final   • Platelets 12/05/2022 189  140 - 450 10*3/mm3 Final   • Neutrophil % 12/05/2022 41.7 (L)  42.7 - 76.0 % Final   • Lymphocyte % 12/05/2022 47.9 (H)  19.6 - 45.3 % Final   • Monocyte % 12/05/2022 6.6  5.0 - 12.0 % Final   • Eosinophil % 12/05/2022 2.6  0.3 - 6.2 % Final   • Basophil % 12/05/2022 1.0  0.0 - 1.5 % Final   • Immature Grans % 12/05/2022 0.2  0.0 - 0.5 % Final   • Neutrophils, Absolute 12/05/2022 3.58  1.70 - 7.00 10*3/mm3 Final   • Lymphocytes, Absolute 12/05/2022 4.12 (H)  0.70 - 3.10 10*3/mm3 Final   • Monocytes, Absolute 12/05/2022 0.57  0.10 - 0.90 10*3/mm3 Final   • Eosinophils, Absolute 12/05/2022 0.22  0.00 - 0.40 10*3/mm3 Final   • Basophils, Absolute 12/05/2022 0.09  0.00 - 0.20 10*3/mm3 Final   • Immature Grans, Absolute 12/05/2022 0.02  0.00 - 0.05 10*3/mm3 Final   • nRBC 12/05/2022 0.0  0.0 - 0.2 /100 WBC Final   • Glucose 12/05/2022 129 (H)  65 - 99 mg/dL Final   • BUN 12/05/2022 15  8 - 23 mg/dL Final   • Creatinine 12/05/2022 1.19  0.76 -  1.27 mg/dL Final   • Sodium 12/05/2022 139  136 - 145 mmol/L Final   • Potassium 12/05/2022 4.3  3.5 - 5.2 mmol/L Final   • Chloride 12/05/2022 103  98 - 107 mmol/L Final   • CO2 12/05/2022 27.0  22.0 - 29.0 mmol/L Final   • Calcium 12/05/2022 9.4  8.6 - 10.5 mg/dL Final   • Total Protein 12/05/2022 6.9  6.0 - 8.5 g/dL Final   • Albumin 12/05/2022 4.50  3.50 - 5.20 g/dL Final   • ALT (SGPT) 12/05/2022 17  1 - 41 U/L Final   • AST (SGOT) 12/05/2022 17  1 - 40 U/L Final   • Alkaline Phosphatase 12/05/2022 70  39 - 117 U/L Final   • Total Bilirubin 12/05/2022 0.4  0.0 - 1.2 mg/dL Final   • Globulin 12/05/2022 2.4  gm/dL Final   • A/G Ratio 12/05/2022 1.9  g/dL Final   • BUN/Creatinine Ratio 12/05/2022 12.6  7.0 - 25.0 Final   • Anion Gap 12/05/2022 9.0  5.0 - 15.0 mmol/L Final   • eGFR 12/05/2022 66.5  >60.0 mL/min/1.73 Final    National Kidney Foundation and American Society of Nephrology (ASN) Task Force recommended calculation based on the Chronic Kidney Disease Epidemiology Collaboration (CKD-EPI) equation refit without adjustment for race.   • Hemoglobin A1C 12/05/2022 6.60 (H)  4.80 - 5.60 % Final   • Total Cholesterol 12/05/2022 106  0 - 200 mg/dL Final   • Triglycerides 12/05/2022 149  0 - 150 mg/dL Final   • HDL Cholesterol 12/05/2022 40  40 - 60 mg/dL Final   • LDL Cholesterol  12/05/2022 41  0 - 100 mg/dL Final   • VLDL Cholesterol 12/05/2022 25  5 - 40 mg/dL Final   • LDL/HDL Ratio 12/05/2022 0.91   Final   • 25 Hydroxy, Vitamin D 12/05/2022 39.2  30.0 - 100.0 ng/ml Final   • Vitamin B-12 12/05/2022 500  211 - 946 pg/mL Final   • Microalbumin/Creatinine Ratio 12/05/2022    Final    Unable to calculate   • Creatinine, Urine 12/05/2022 98.0  mg/dL Final   • Microalbumin, Urine 12/05/2022 <1.2  mg/dL Final   Lab on 08/31/2022   Component Date Value Ref Range Status   • WBC 08/31/2022 7.38  3.40 - 10.80 10*3/mm3 Final   • RBC 08/31/2022 5.10  4.14 - 5.80 10*6/mm3 Final   • Hemoglobin 08/31/2022 16.3  13.0 -  17.7 g/dL Final   • Hematocrit 08/31/2022 44.8  37.5 - 51.0 % Final   • MCV 08/31/2022 87.8  79.0 - 97.0 fL Final   • MCH 08/31/2022 32.0  26.6 - 33.0 pg Final   • MCHC 08/31/2022 36.4 (H)  31.5 - 35.7 g/dL Final   • RDW 08/31/2022 13.3  12.3 - 15.4 % Final   • RDW-SD 08/31/2022 41.2  37.0 - 54.0 fl Final   • MPV 08/31/2022 12.0  6.0 - 12.0 fL Final   • Platelets 08/31/2022 244  140 - 450 10*3/mm3 Final   • Neutrophil % 08/31/2022 47.1  42.7 - 76.0 % Final   • Lymphocyte % 08/31/2022 44.7  19.6 - 45.3 % Final   • Monocyte % 08/31/2022 4.3 (L)  5.0 - 12.0 % Final   • Eosinophil % 08/31/2022 1.9  0.3 - 6.2 % Final   • Basophil % 08/31/2022 1.5  0.0 - 1.5 % Final   • Immature Grans % 08/31/2022 0.5  0.0 - 0.5 % Final   • Neutrophils, Absolute 08/31/2022 3.47  1.70 - 7.00 10*3/mm3 Final   • Lymphocytes, Absolute 08/31/2022 3.30 (H)  0.70 - 3.10 10*3/mm3 Final   • Monocytes, Absolute 08/31/2022 0.32  0.10 - 0.90 10*3/mm3 Final   • Eosinophils, Absolute 08/31/2022 0.14  0.00 - 0.40 10*3/mm3 Final   • Basophils, Absolute 08/31/2022 0.11  0.00 - 0.20 10*3/mm3 Final   • Immature Grans, Absolute 08/31/2022 0.04  0.00 - 0.05 10*3/mm3 Final   • nRBC 08/31/2022 0.0  0.0 - 0.2 /100 WBC Final   • Glucose 08/31/2022 121 (H)  65 - 99 mg/dL Final   • BUN 08/31/2022 14  8 - 23 mg/dL Final   • Creatinine 08/31/2022 0.95  0.76 - 1.27 mg/dL Final   • Sodium 08/31/2022 142  136 - 145 mmol/L Final   • Potassium 08/31/2022 4.2  3.5 - 5.2 mmol/L Final   • Chloride 08/31/2022 104  98 - 107 mmol/L Final   • CO2 08/31/2022 27.0  22.0 - 29.0 mmol/L Final   • Calcium 08/31/2022 9.2  8.6 - 10.5 mg/dL Final   • Total Protein 08/31/2022 7.1  6.0 - 8.5 g/dL Final   • Albumin 08/31/2022 4.50  3.50 - 5.20 g/dL Final   • ALT (SGPT) 08/31/2022 19  1 - 41 U/L Final   • AST (SGOT) 08/31/2022 14  1 - 40 U/L Final   • Alkaline Phosphatase 08/31/2022 69  39 - 117 U/L Final   • Total Bilirubin 08/31/2022 0.5  0.0 - 1.2 mg/dL Final   • Globulin 08/31/2022 2.6   gm/dL Final   • A/G Ratio 08/31/2022 1.7  g/dL Final   • BUN/Creatinine Ratio 08/31/2022 14.7  7.0 - 25.0 Final   • Anion Gap 08/31/2022 11.0  5.0 - 15.0 mmol/L Final   • eGFR 08/31/2022 87.2  >60.0 mL/min/1.73 Final    National Kidney Foundation and American Society of Nephrology (ASN) Task Force recommended calculation based on the Chronic Kidney Disease Epidemiology Collaboration (CKD-EPI) equation refit without adjustment for race.   • Hemoglobin A1C 08/31/2022 7.10 (H)  4.80 - 5.60 % Final   • Total Cholesterol 08/31/2022 121  0 - 200 mg/dL Final   • Triglycerides 08/31/2022 142  0 - 150 mg/dL Final   • HDL Cholesterol 08/31/2022 44  40 - 60 mg/dL Final   • LDL Cholesterol  08/31/2022 52  0 - 100 mg/dL Final   • VLDL Cholesterol 08/31/2022 25  5 - 40 mg/dL Final   • LDL/HDL Ratio 08/31/2022 1.10   Final   • TSH 08/31/2022 1.580  0.270 - 4.200 uIU/mL Final   • 25 Hydroxy, Vitamin D 08/31/2022 35.2  30.0 - 100.0 ng/ml Final   • Vitamin B-12 08/31/2022 1,184 (H)  211 - 946 pg/mL Final   • Free T4 08/31/2022 1.00  0.93 - 1.70 ng/dL Final      Adult Transthoracic Echo Complete w/ Color, Spectral and Contrast if Necessary Per Protocol  •  Left ventricular systolic function is normal. Calculated left   ventricular EF = 60% Left ventricular ejection fraction appears to be 56 -   60%. Normal left ventricular cavity size and wall thickness noted. Apical   hypokinesis Left ventricular diastolic function is consistent with (grade   I) impaired relaxation.  •  Estimated right ventricular systolic pressure from tricuspid   regurgitation is normal (<35 mmHg).  •  Mild TR    [unfilled]  Immunization History   Administered Date(s) Administered   • COVID-19 (MODERNA) 1st, 2nd, 3rd Dose Only 02/25/2021, 02/25/2021, 03/24/2021, 03/25/2021   • Fluzone High-Dose 65+yrs 12/30/2021   • Pneumococcal Polysaccharide (PPSV23) 03/05/2021   • Tdap 03/05/2021       The following portions of the patient's history were reviewed and updated  "as appropriate: allergies, current medications, past family history, past medical history, past social history, past surgical history and problem list.        Physical Exam  /62 (BP Location: Right arm, Patient Position: Sitting, Cuff Size: Adult)   Pulse 68   Temp 98.1 °F (36.7 °C)   Ht 172.7 cm (68\")   Wt 86.3 kg (190 lb 3.2 oz)   SpO2 99%   BMI 28.92 kg/m²       Physical Exam  Vitals and nursing note reviewed.   Constitutional:       Appearance: He is well-developed. He is not diaphoretic.   HENT:      Head: Normocephalic and atraumatic.      Right Ear: External ear normal.   Eyes:      Conjunctiva/sclera: Conjunctivae normal.      Pupils: Pupils are equal, round, and reactive to light.   Cardiovascular:      Rate and Rhythm: Normal rate and regular rhythm.      Heart sounds: Normal heart sounds. No murmur heard.  Pulmonary:      Effort: Pulmonary effort is normal. No respiratory distress.      Breath sounds: Normal breath sounds.   Abdominal:      General: Bowel sounds are normal. There is no distension.      Palpations: Abdomen is soft.      Tenderness: There is no abdominal tenderness.   Musculoskeletal:         General: Tenderness present. No deformity. Normal range of motion.      Cervical back: Normal range of motion and neck supple.   Skin:     General: Skin is warm.      Coloration: Skin is not pale.      Findings: No erythema or rash.   Neurological:      Mental Status: He is alert and oriented to person, place, and time.      Cranial Nerves: No cranial nerve deficit.   Psychiatric:         Behavior: Behavior normal.         [unfilled]   Diagnosis Plan   1. Controlled type 2 diabetes mellitus with complication, without long-term current use of insulin (HCC)  CBC Auto Differential    Comprehensive Metabolic Panel    Hemoglobin A1c    Lipid Panel      2. Mixed hyperlipidemia  CBC Auto Differential    Comprehensive Metabolic Panel    Hemoglobin A1c    Lipid Panel      3. Vitamin D " deficiency, unspecified   CBC Auto Differential    Comprehensive Metabolic Panel    Hemoglobin A1c    Lipid Panel      4. Essential hypertension  CBC Auto Differential    Comprehensive Metabolic Panel    Hemoglobin A1c    Lipid Panel      5. Coronary artery disease, unspecified vessel or lesion type, unspecified whether angina present, unspecified whether native or transplanted heart  CBC Auto Differential    Comprehensive Metabolic Panel    Hemoglobin A1c    Lipid Panel      6. S/P CABG (coronary artery bypass graft)  CBC Auto Differential    Comprehensive Metabolic Panel    Hemoglobin A1c    Lipid Panel      7. Stage 2 chronic kidney disease  CBC Auto Differential    Comprehensive Metabolic Panel    Hemoglobin A1c    Lipid Panel      8. Overweight                     -recommend labowrk   -recommend colonoscopy screening  -next colonoscopy in 2024   -right shoulder pain/Ac joint arthropathy - Orthopedic following   -constipation - was on  miralax 17 mg daily.  High fiber diet   -genital lesions/ulcer/genital herpes - referred  to Dermatology. Refilled valtrex for 500 mg PO BID for 10 days   -CKD stage 2- continue to monitor. Gave information   -CAD sp CABG  X 4  Have records from [facility].  On aspirin crestor toprol XL. Cardiology following had recent stress test   -HTN - on toprol XL 25 mg PO q daily.   -overweight - counseled weight loss >5 minutes BMI at 28.92   -DM type 2 on metformin 1000 mg PO q daily on farxiga 10 mg daily  -history of genital herpes -on valtrex  -HLP - on crestor 10 mg pO qhs start on Vascepa 2 grams PO BID   -advised pt to be safe and call with questions and concerns  -advised pt to go to ER or call 911 if symptoms worrisome or severe  -advised pt to followup with specialist and referrals  -advised pt to be safe during COVID-19 pandemic  .I spent 25  minutes caring for Juan on this date of service. This time includes time spent by me in the following activities: preparing for the visit,  reviewing tests, obtaining and/or reviewing a separately obtained history, performing a medically appropriate examination and/or evaluation, counseling and educating the patient/family/caregiver, ordering medications, tests, or procedures, referring and communicating with other health care professionals, documenting information in the medical record and care coordination.   -recheck in 4  months         This document has been electronically signed by Gage Means MD on February 28, 2023 10:18 CST

## 2023-02-28 ENCOUNTER — OFFICE VISIT (OUTPATIENT)
Dept: FAMILY MEDICINE CLINIC | Facility: CLINIC | Age: 68
End: 2023-02-28
Payer: MEDICARE

## 2023-02-28 VITALS
BODY MASS INDEX: 28.82 KG/M2 | HEIGHT: 68 IN | OXYGEN SATURATION: 99 % | HEART RATE: 68 BPM | WEIGHT: 190.2 LBS | SYSTOLIC BLOOD PRESSURE: 126 MMHG | TEMPERATURE: 98.1 F | DIASTOLIC BLOOD PRESSURE: 62 MMHG

## 2023-02-28 DIAGNOSIS — E55.9 VITAMIN D DEFICIENCY, UNSPECIFIED: ICD-10-CM

## 2023-02-28 DIAGNOSIS — I10 ESSENTIAL HYPERTENSION: ICD-10-CM

## 2023-02-28 DIAGNOSIS — E66.3 OVERWEIGHT: ICD-10-CM

## 2023-02-28 DIAGNOSIS — Z95.1 S/P CABG (CORONARY ARTERY BYPASS GRAFT): ICD-10-CM

## 2023-02-28 DIAGNOSIS — N18.2 STAGE 2 CHRONIC KIDNEY DISEASE: ICD-10-CM

## 2023-02-28 DIAGNOSIS — E78.2 MIXED HYPERLIPIDEMIA: ICD-10-CM

## 2023-02-28 DIAGNOSIS — I25.10 CORONARY ARTERY DISEASE, UNSPECIFIED VESSEL OR LESION TYPE, UNSPECIFIED WHETHER ANGINA PRESENT, UNSPECIFIED WHETHER NATIVE OR TRANSPLANTED HEART: ICD-10-CM

## 2023-02-28 DIAGNOSIS — E11.8 CONTROLLED TYPE 2 DIABETES MELLITUS WITH COMPLICATION, WITHOUT LONG-TERM CURRENT USE OF INSULIN: Primary | ICD-10-CM

## 2023-02-28 PROCEDURE — 99214 OFFICE O/P EST MOD 30 MIN: CPT | Performed by: FAMILY MEDICINE

## 2023-03-24 ENCOUNTER — LAB (OUTPATIENT)
Dept: LAB | Facility: HOSPITAL | Age: 68
End: 2023-03-24
Payer: MEDICARE

## 2023-03-24 DIAGNOSIS — Z95.1 S/P CABG (CORONARY ARTERY BYPASS GRAFT): ICD-10-CM

## 2023-03-24 DIAGNOSIS — E55.9 VITAMIN D DEFICIENCY, UNSPECIFIED: ICD-10-CM

## 2023-03-24 DIAGNOSIS — E78.2 MIXED HYPERLIPIDEMIA: ICD-10-CM

## 2023-03-24 DIAGNOSIS — E11.8 CONTROLLED TYPE 2 DIABETES MELLITUS WITH COMPLICATION, WITHOUT LONG-TERM CURRENT USE OF INSULIN: ICD-10-CM

## 2023-03-24 DIAGNOSIS — I25.10 CORONARY ARTERY DISEASE, UNSPECIFIED VESSEL OR LESION TYPE, UNSPECIFIED WHETHER ANGINA PRESENT, UNSPECIFIED WHETHER NATIVE OR TRANSPLANTED HEART: ICD-10-CM

## 2023-03-24 DIAGNOSIS — N18.2 STAGE 2 CHRONIC KIDNEY DISEASE: ICD-10-CM

## 2023-03-24 DIAGNOSIS — I10 ESSENTIAL HYPERTENSION: ICD-10-CM

## 2023-03-24 PROCEDURE — 83036 HEMOGLOBIN GLYCOSYLATED A1C: CPT

## 2023-03-24 PROCEDURE — 85025 COMPLETE CBC W/AUTO DIFF WBC: CPT

## 2023-03-24 PROCEDURE — 80061 LIPID PANEL: CPT

## 2023-03-24 PROCEDURE — 80053 COMPREHEN METABOLIC PANEL: CPT

## 2023-03-25 LAB
ALBUMIN SERPL-MCNC: 4.8 G/DL (ref 3.5–5.2)
ALBUMIN/GLOB SERPL: 1.7 G/DL
ALP SERPL-CCNC: 67 U/L (ref 39–117)
ALT SERPL W P-5'-P-CCNC: 21 U/L (ref 1–41)
ANION GAP SERPL CALCULATED.3IONS-SCNC: 11 MMOL/L (ref 5–15)
AST SERPL-CCNC: 18 U/L (ref 1–40)
BASOPHILS # BLD AUTO: 0.1 10*3/MM3 (ref 0–0.2)
BASOPHILS NFR BLD AUTO: 1.1 % (ref 0–1.5)
BILIRUB SERPL-MCNC: 0.5 MG/DL (ref 0–1.2)
BUN SERPL-MCNC: 23 MG/DL (ref 8–23)
BUN/CREAT SERPL: 24.7 (ref 7–25)
CALCIUM SPEC-SCNC: 9.5 MG/DL (ref 8.6–10.5)
CHLORIDE SERPL-SCNC: 102 MMOL/L (ref 98–107)
CHOLEST SERPL-MCNC: 116 MG/DL (ref 0–200)
CO2 SERPL-SCNC: 27 MMOL/L (ref 22–29)
CREAT SERPL-MCNC: 0.93 MG/DL (ref 0.76–1.27)
DEPRECATED RDW RBC AUTO: 43.6 FL (ref 37–54)
EGFRCR SERPLBLD CKD-EPI 2021: 89.4 ML/MIN/1.73
EOSINOPHIL # BLD AUTO: 0.26 10*3/MM3 (ref 0–0.4)
EOSINOPHIL NFR BLD AUTO: 2.8 % (ref 0.3–6.2)
ERYTHROCYTE [DISTWIDTH] IN BLOOD BY AUTOMATED COUNT: 13.1 % (ref 12.3–15.4)
GLOBULIN UR ELPH-MCNC: 2.9 GM/DL
GLUCOSE SERPL-MCNC: 127 MG/DL (ref 65–99)
HBA1C MFR BLD: 6.9 % (ref 4.8–5.6)
HCT VFR BLD AUTO: 49.3 % (ref 37.5–51)
HDLC SERPL-MCNC: 47 MG/DL (ref 40–60)
HGB BLD-MCNC: 16.6 G/DL (ref 13–17.7)
IMM GRANULOCYTES # BLD AUTO: 0.02 10*3/MM3 (ref 0–0.05)
IMM GRANULOCYTES NFR BLD AUTO: 0.2 % (ref 0–0.5)
LDLC SERPL CALC-MCNC: 44 MG/DL (ref 0–100)
LDLC/HDLC SERPL: 0.85 {RATIO}
LYMPHOCYTES # BLD AUTO: 4.6 10*3/MM3 (ref 0.7–3.1)
LYMPHOCYTES NFR BLD AUTO: 49.5 % (ref 19.6–45.3)
MCH RBC QN AUTO: 30.7 PG (ref 26.6–33)
MCHC RBC AUTO-ENTMCNC: 33.7 G/DL (ref 31.5–35.7)
MCV RBC AUTO: 91.1 FL (ref 79–97)
MONOCYTES # BLD AUTO: 0.58 10*3/MM3 (ref 0.1–0.9)
MONOCYTES NFR BLD AUTO: 6.2 % (ref 5–12)
NEUTROPHILS NFR BLD AUTO: 3.74 10*3/MM3 (ref 1.7–7)
NEUTROPHILS NFR BLD AUTO: 40.2 % (ref 42.7–76)
NRBC BLD AUTO-RTO: 0.1 /100 WBC (ref 0–0.2)
PLATELET # BLD AUTO: 192 10*3/MM3 (ref 140–450)
PMV BLD AUTO: 11.2 FL (ref 6–12)
POTASSIUM SERPL-SCNC: 4.5 MMOL/L (ref 3.5–5.2)
PROT SERPL-MCNC: 7.7 G/DL (ref 6–8.5)
RBC # BLD AUTO: 5.41 10*6/MM3 (ref 4.14–5.8)
SODIUM SERPL-SCNC: 140 MMOL/L (ref 136–145)
TRIGL SERPL-MCNC: 145 MG/DL (ref 0–150)
VLDLC SERPL-MCNC: 25 MG/DL (ref 5–40)
WBC NRBC COR # BLD: 9.3 10*3/MM3 (ref 3.4–10.8)

## 2023-04-23 NOTE — PROGRESS NOTES
Subjective:  Malcolm Cheng is a 68 y.o. male who presents for       Patient Active Problem List   Diagnosis   • Coronary artery disease   • Vitamin D deficiency, unspecified    • Controlled type 2 diabetes mellitus with complication, without long-term current use of insulin   • Mixed hyperlipidemia   • Overweight   • Yeast infection   • Herpes infection   • Right shoulder pain   • Heart disease   • Encounter for colonoscopy due to history of colonic polyp   • Recurrent genital herpes simplex   • S/P CABG (coronary artery bypass graft)   • Essential hypertension   • Stage 2 chronic kidney disease   • Lymphocytosis           Current Outpatient Medications:   •  aspirin 81 MG EC tablet, Take 1 tablet by mouth Daily., Disp: , Rfl:   •  dapagliflozin Propanediol (Farxiga) 10 MG tablet, Take 10 mg by mouth Daily., Disp: 90 tablet, Rfl: 3  •  metFORMIN (GLUCOPHAGE) 1000 MG tablet, TAKE ONE TABLET BY MOUTH TWICE A DAY WITH MEALS, Disp: 180 tablet, Rfl: 0  •  metoprolol succinate XL (TOPROL-XL) 25 MG 24 hr tablet, Take 1 tablet by mouth Daily., Disp: 90 tablet, Rfl: 1  •  Omega-3 Fatty Acids (fish oil) 1000 MG capsule capsule, Take 1 capsule by mouth., Disp: , Rfl:   •  rosuvastatin (CRESTOR) 10 MG tablet, Take 1 tablet by mouth Every Night., Disp: 90 tablet, Rfl: 1  •  valACYclovir (VALTREX) 500 MG tablet, Take 1 tablet by mouth Daily. (Patient taking differently: Take 1 tablet by mouth As Needed.), Disp: 90 tablet, Rfl: 1  •  vitamin D (ERGOCALCIFEROL) 1.25 MG (06326 UT) capsule capsule, Take 1 capsule by mouth Every 7 (Seven) Days., Disp: 4 capsule, Rfl: 3    HPI          Pt is 68 yo male with management of being overweight  DM Type 2, HTN, HLP, history of genital herpes, CAD sp CABG x 4, history of colonic polyps, herpes type 1 and 2 positive, history of colonic polyps , CKD stage 2       11/30/22 in office visit for recheck. Pt had labwork done on 8/31/22 that showed high vitamin B12. hga1c was at 7.10. from  6.90. CMP showed glucose at 122 GFR at 87.2 CBC showed stable hemoglobin and WBC. His AAA screening recently was negative. Also his recent CT of chest lung cancer screening showed no suspicious nodules; pt is doing well. BP is stable . Breathing stable.      2/28/23 in office visit for recheck. Pt saw Cardiology on 12/8/22 for his CAD sp CABG and stress test and echo was ordered. His recent stress test showed changes form his documented CAD and previous bypass surgeyr no new areas of iscehmia pt had labwork on 12/5/22 that showed stable vitamin D and b12 lipid panel was stable with LDL at 41. hga1c is at 6.60 from 7.10. CMP showed GFR at 66.5 from 87.2. CBC showed normal hemoglobin and WBC. Pt is doing well overall no chest pain no dizziness. Breathing stable. Sugars have better     5/30/23 in office visit for recheck. Pt had labwork done on 3/24/23 that showed lipid panel hga1c was at  6.9. CMP showed glucose at 127. GFR at 89.4 and normal liver enzymes. CBC showed normal hemoglobin and WBC lymphocytes were high at 49.5. blood pressure stable. Sugars stable.      Diabetes  He presents for his initial diabetic visit. He has type 2 diabetes mellitus. His disease course has been stable. Pertinent negatives for hypoglycemia include no confusion, dizziness, headaches, hunger, mood changes, nervousness/anxiousness, pallor, seizures, sleepiness, speech difficulty, sweats or tremors. Pertinent negatives for diabetes include no blurred vision, no chest pain, no fatigue, no foot paresthesias, no foot ulcerations, no polydipsia, no polyphagia, no polyuria, no visual change, no weakness and no weight loss. Pertinent negatives for hypoglycemia complications include no blackouts, no hospitalization, no nocturnal hypoglycemia, no required assistance and no required glucagon injection. Symptoms are stable. Pertinent negatives for diabetic complications include no autonomic neuropathy, CVA, heart  disease, impotence, nephropathy, PVD or retinopathy. Risk factors for coronary artery disease include diabetes mellitus, dyslipidemia, hypertension and sedentary lifestyle. Current diabetic treatment includes oral agent (dual therapy). He is compliant with treatment most of the time. His weight is stable. He is following a generally healthy diet. He has not had a previous visit with a dietitian. He participates in exercise intermittently. An ACE inhibitor/angiotensin II receptor blocker is not being taken. He does not see a podiatrist.Eye exam is not current.   Hypertension  This is a recurrent problem. The current episode started more than 1 month ago. The problem is unchanged. The problem is controlled. Pertinent negatives include no blurred vision, chest pain, headaches, shortness of breath () or sweats. Risk factors for coronary artery disease include diabetes mellitus, dyslipidemia, male gender and sedentary lifestyle. Past treatments include beta blockers. Current antihypertension treatment includes beta blockers. The current treatment provides significant improvement. There are no compliance problems.  There is no history of angina, kidney disease, CAD/MI, CVA, heart failure, left ventricular hypertrophy, PVD or retinopathy. There is no history of chronic renal disease, coarctation of the aorta, hyperaldosteronism, hypercortisolism, hyperparathyroidism, a hypertension causing med, pheochromocytoma, renovascular disease, sleep apnea or a thyroid problem.   Hyperlipidemia  This is a recurrent problem. The current episode started more than 1 month ago. The problem is controlled. Recent lipid tests were reviewed and are variable. He has no history of chronic renal disease, diabetes, hypothyroidism, liver disease, obesity or nephrotic syndrome. Pertinent negatives include no chest pain or shortness of breath (). Current antihyperlipidemic treatment includes statins. The current treatment  provides moderate improvement of lipids. Risk factors for coronary artery disease include male sex, dyslipidemia and diabetes mellitus.     Review of Systems  Review of Systems   Constitutional: Positive for activity change and fatigue. Negative for appetite change, chills, diaphoresis and fever.   HENT: Negative for congestion, postnasal drip, rhinorrhea, sinus pressure, sinus pain, sneezing, sore throat, trouble swallowing and voice change.    Respiratory: Negative for cough, choking, chest tightness, shortness of breath, wheezing and stridor.    Cardiovascular: Negative for chest pain.   Gastrointestinal: Negative for diarrhea, nausea and vomiting.   Musculoskeletal: Positive for arthralgias.   Neurological: Positive for weakness and numbness. Negative for headaches.       Patient Active Problem List   Diagnosis   • Coronary artery disease   • Vitamin D deficiency, unspecified    • Controlled type 2 diabetes mellitus with complication, without long-term current use of insulin   • Mixed hyperlipidemia   • Overweight   • Yeast infection   • Herpes infection   • Right shoulder pain   • Heart disease   • Encounter for colonoscopy due to history of colonic polyp   • Recurrent genital herpes simplex   • S/P CABG (coronary artery bypass graft)   • Essential hypertension   • Stage 2 chronic kidney disease   • Lymphocytosis     Past Surgical History:   Procedure Laterality Date   • APPENDECTOMY     • CARDIAC SURGERY     • CHOLECYSTECTOMY     • COLONOSCOPY N/A 5/3/2021    Procedure: COLONOSCOPY a month out;  Surgeon: Hector Dennison MD;  Location: [facility] ENDOSCOPY;  Service: Gastroenterology;  Laterality: N/A;     Social History     Socioeconomic History   • Marital status:    Tobacco Use   • Smoking status: Former     Types: Cigarettes     Quit date:      Years since quittin.4   • Smokeless tobacco: Never   Vaping Use   • Vaping Use: Never used   Substance and Sexual Activity   • Alcohol use: Not  Currently   • Drug use: Never   • Sexual activity: Defer     Family History   Problem Relation Age of Onset   • Heart disease Father    • Alcohol abuse Father    • Cancer Brother      Lab on 03/24/2023   Component Date Value Ref Range Status   • WBC 03/24/2023 9.30  3.40 - 10.80 10*3/mm3 Final   • RBC 03/24/2023 5.41  4.14 - 5.80 10*6/mm3 Final   • Hemoglobin 03/24/2023 16.6  13.0 - 17.7 g/dL Final   • Hematocrit 03/24/2023 49.3  37.5 - 51.0 % Final   • MCV 03/24/2023 91.1  79.0 - 97.0 fL Final   • MCH 03/24/2023 30.7  26.6 - 33.0 pg Final   • MCHC 03/24/2023 33.7  31.5 - 35.7 g/dL Final   • RDW 03/24/2023 13.1  12.3 - 15.4 % Final   • RDW-SD 03/24/2023 43.6  37.0 - 54.0 fl Final   • MPV 03/24/2023 11.2  6.0 - 12.0 fL Final   • Platelets 03/24/2023 192  140 - 450 10*3/mm3 Final   • Neutrophil % 03/24/2023 40.2 (L)  42.7 - 76.0 % Final   • Lymphocyte % 03/24/2023 49.5 (H)  19.6 - 45.3 % Final   • Monocyte % 03/24/2023 6.2  5.0 - 12.0 % Final   • Eosinophil % 03/24/2023 2.8  0.3 - 6.2 % Final   • Basophil % 03/24/2023 1.1  0.0 - 1.5 % Final   • Immature Grans % 03/24/2023 0.2  0.0 - 0.5 % Final   • Neutrophils, Absolute 03/24/2023 3.74  1.70 - 7.00 10*3/mm3 Final   • Lymphocytes, Absolute 03/24/2023 4.60 (H)  0.70 - 3.10 10*3/mm3 Final   • Monocytes, Absolute 03/24/2023 0.58  0.10 - 0.90 10*3/mm3 Final   • Eosinophils, Absolute 03/24/2023 0.26  0.00 - 0.40 10*3/mm3 Final   • Basophils, Absolute 03/24/2023 0.10  0.00 - 0.20 10*3/mm3 Final   • Immature Grans, Absolute 03/24/2023 0.02  0.00 - 0.05 10*3/mm3 Final   • nRBC 03/24/2023 0.1  0.0 - 0.2 /100 WBC Final   • Glucose 03/24/2023 127 (H)  65 - 99 mg/dL Final   • BUN 03/24/2023 23  8 - 23 mg/dL Final   • Creatinine 03/24/2023 0.93  0.76 - 1.27 mg/dL Final   • Sodium 03/24/2023 140  136 - 145 mmol/L Final   • Potassium 03/24/2023 4.5  3.5 - 5.2 mmol/L Final   • Chloride 03/24/2023 102  98 - 107 mmol/L Final   • CO2 03/24/2023 27.0  22.0 - 29.0 mmol/L Final   • Calcium  03/24/2023 9.5  8.6 - 10.5 mg/dL Final   • Total Protein 03/24/2023 7.7  6.0 - 8.5 g/dL Final   • Albumin 03/24/2023 4.8  3.5 - 5.2 g/dL Final   • ALT (SGPT) 03/24/2023 21  1 - 41 U/L Final   • AST (SGOT) 03/24/2023 18  1 - 40 U/L Final   • Alkaline Phosphatase 03/24/2023 67  39 - 117 U/L Final   • Total Bilirubin 03/24/2023 0.5  0.0 - 1.2 mg/dL Final   • Globulin 03/24/2023 2.9  gm/dL Final   • A/G Ratio 03/24/2023 1.7  g/dL Final   • BUN/Creatinine Ratio 03/24/2023 24.7  7.0 - 25.0 Final   • Anion Gap 03/24/2023 11.0  5.0 - 15.0 mmol/L Final   • eGFR 03/24/2023 89.4  >60.0 mL/min/1.73 Final   • Hemoglobin A1C 03/24/2023 6.90 (H)  4.80 - 5.60 % Final   • Total Cholesterol 03/24/2023 116  0 - 200 mg/dL Final   • Triglycerides 03/24/2023 145  0 - 150 mg/dL Final   • HDL Cholesterol 03/24/2023 47  40 - 60 mg/dL Final   • LDL Cholesterol  03/24/2023 44  0 - 100 mg/dL Final   • VLDL Cholesterol 03/24/2023 25  5 - 40 mg/dL Final   • LDL/HDL Ratio 03/24/2023 0.85   Final   Ancillary Procedure on 02/27/2023   Component Date Value Ref Range Status   • Target HR (85%) 02/27/2023 129  bpm Final   • Max. Pred. HR (100%) 02/27/2023 152  bpm Final   • EF(MOD-bp) 02/27/2023 60.0  % Final   • LVOT area 02/27/2023 4.4  cm2 Final   • LVOT diam 02/27/2023 2.37  cm Final   • EDV(MOD-sp2) 02/27/2023 63.4  ml Final   • EDV(MOD-sp4) 02/27/2023 76.1  ml Final   • ESV(MOD-sp2) 02/27/2023 24.0  ml Final   • ESV(MOD-sp4) 02/27/2023 29.8  ml Final   • SV(MOD-sp2) 02/27/2023 39.4  ml Final   • SV(MOD-sp4) 02/27/2023 46.3  ml Final   • EF(MOD-sp2) 02/27/2023 62.1  % Final   • EF(MOD-sp4) 02/27/2023 60.8  % Final   • MV E max syed 02/27/2023 72.3  cm/sec Final   • MV A max syed 02/27/2023 71.3  cm/sec Final   • MV dec time 02/27/2023 0.29  msec Final   • MV E/A 02/27/2023 1.01   Final   • Med Peak E' Syed 02/27/2023 7.5  cm/sec Final   • Lat Peak E' Syed 02/27/2023 13.1  cm/sec Final   • Avg E/e' ratio 02/27/2023 7.02   Final   • SV(LVOT) 02/27/2023  85.6  ml Final   • TAPSE (>1.6) 02/27/2023 2.49  cm Final   • LV V1 max 02/27/2023 90.7  cm/sec Final   • LV V1 max PG 02/27/2023 3.3  mmHg Final   • LV V1 mean PG 02/27/2023 1.64  mmHg Final   • LV V1 VTI 02/27/2023 19.4  cm Final   • Ao pk jayde 02/27/2023 87.8  cm/sec Final   • Ao max PG 02/27/2023 3.1  mmHg Final   • Ao mean PG 02/27/2023 1.63  mmHg Final   • Ao V2 VTI 02/27/2023 18.3  cm Final   • NATALIA(I,D) 02/27/2023 4.7  cm2 Final   • MV max PG 02/27/2023 3.3  mmHg Final   • MV mean PG 02/27/2023 1.10  mmHg Final   • MV V2 VTI 02/27/2023 30.3  cm Final   • MVA(VTI) 02/27/2023 2.8  cm2 Final   • MV dec slope 02/27/2023 249.0  cm/sec2 Final   • MR max jayde 02/27/2023 398.8  cm/sec Final   • MR max PG 02/27/2023 63.6  mmHg Final   • TR max jayde 02/27/2023 208.6  cm/sec Final   • TR max PG 02/27/2023 17.4  mmHg Final   • RVSP(TR) 02/27/2023 20.4  mmHg Final   • RAP systole 02/27/2023 3.0  mmHg Final   • RV V1 max PG 02/27/2023 2.12  mmHg Final   • RV V1 max 02/27/2023 72.8  cm/sec Final   • RV V1 VTI 02/27/2023 16.9  cm Final   • PA V2 max 02/27/2023 96.2  cm/sec Final   • Ao root diam 02/27/2023 4.0  cm Final   • ACS 02/27/2023 1.69  cm Final   • Sinus 02/27/2023 3.5  cm Final   Hospital Outpatient Visit on 12/21/2022   Component Date Value Ref Range Status   • Target HR (85%) 12/21/2022 129  bpm Final   • Max. Pred. HR (100%) 12/21/2022 152  bpm Final   • BH CV STRESS PROTOCOL 1 12/21/2022 Michael   Final   • Stage 1 12/21/2022 1   Final   • HR Stage 1 12/21/2022 122   Final   • BP Stage 1 12/21/2022 135/97   Final   • Duration Min Stage 1 12/21/2022 3   Final   • Duration Sec Stage 1 12/21/2022 0   Final   • Grade Stage 1 12/21/2022 10   Final   • Speed Stage 1 12/21/2022 1.7   Final   • BH CV STRESS METS STAGE 1 12/21/2022 5   Final   • Stress Dose Regadenoson Stage 1 12/21/2022 0.4   Final   • Stress Comments Stage 1 12/21/2022 10 sec bolus injection   Final   • Stage 2 12/21/2022 2   Final   • HR Stage 2  12/21/2022 122   Final   • Duration Sec Stage 2 12/21/2022 11   Final   • Grade Stage 2 12/21/2022 12   Final   • Speed Stage 2 12/21/2022 2.5   Final   • BH CV STRESS METS STAGE 2 12/21/2022 7.5   Final   • Stage 3 12/21/2022 3   Final   • HR Stage 3 12/21/2022 118   Final   • BP Stage 3 12/21/2022 146/87   Final   • Duration Min Stage 3 12/21/2022 1   Final   • Duration Sec Stage 3 12/21/2022 22   Final   • Grade Stage 3 12/21/2022 14   Final   • Speed Stage 3 12/21/2022 3.4   Final   • BH CV STRESS METS STAGE 3 12/21/2022 10.0   Final   • Baseline HR 12/21/2022 71  bpm Final   • Baseline BP 12/21/2022 158/104  mmHg Final   • Peak HR 12/21/2022 155  bpm Final   • Percent Max Pred HR 12/21/2022 101.97  % Final   • Percent Target HR 12/21/2022 120  % Final   • Peak BP 12/21/2022 196/91  mmHg Final   • Recovery HR 12/21/2022 75  bpm Final   • Recovery BP 12/21/2022 150/94  mmHg Final   • Exercise duration (min) 12/21/2022 4  min Final   • Exercise duration (sec) 12/21/2022 31  sec Final   • BH CV REST NUCLEAR ISOTOPE DOSE 12/21/2022 10.5  mCi Final   • BH CV STRESS NUCLEAR ISOTOPE DOSE 12/21/2022 35.6  mCi Final   • Nuc Stress EF 12/21/2022 67  % Final   Office Visit on 12/08/2022   Component Date Value Ref Range Status   • QT Interval 12/08/2022 412  ms Final   • QTC Interval 12/08/2022 401  ms Final   Lab on 12/05/2022   Component Date Value Ref Range Status   • WBC 12/05/2022 8.60  3.40 - 10.80 10*3/mm3 Final   • RBC 12/05/2022 5.36  4.14 - 5.80 10*6/mm3 Final   • Hemoglobin 12/05/2022 16.6  13.0 - 17.7 g/dL Final   • Hematocrit 12/05/2022 46.9  37.5 - 51.0 % Final   • MCV 12/05/2022 87.5  79.0 - 97.0 fL Final   • MCH 12/05/2022 31.0  26.6 - 33.0 pg Final   • MCHC 12/05/2022 35.4  31.5 - 35.7 g/dL Final   • RDW 12/05/2022 12.8  12.3 - 15.4 % Final   • RDW-SD 12/05/2022 40.7  37.0 - 54.0 fl Final   • MPV 12/05/2022 10.8  6.0 - 12.0 fL Final   • Platelets 12/05/2022 189  140 - 450 10*3/mm3 Final   • Neutrophil %  12/05/2022 41.7 (L)  42.7 - 76.0 % Final   • Lymphocyte % 12/05/2022 47.9 (H)  19.6 - 45.3 % Final   • Monocyte % 12/05/2022 6.6  5.0 - 12.0 % Final   • Eosinophil % 12/05/2022 2.6  0.3 - 6.2 % Final   • Basophil % 12/05/2022 1.0  0.0 - 1.5 % Final   • Immature Grans % 12/05/2022 0.2  0.0 - 0.5 % Final   • Neutrophils, Absolute 12/05/2022 3.58  1.70 - 7.00 10*3/mm3 Final   • Lymphocytes, Absolute 12/05/2022 4.12 (H)  0.70 - 3.10 10*3/mm3 Final   • Monocytes, Absolute 12/05/2022 0.57  0.10 - 0.90 10*3/mm3 Final   • Eosinophils, Absolute 12/05/2022 0.22  0.00 - 0.40 10*3/mm3 Final   • Basophils, Absolute 12/05/2022 0.09  0.00 - 0.20 10*3/mm3 Final   • Immature Grans, Absolute 12/05/2022 0.02  0.00 - 0.05 10*3/mm3 Final   • nRBC 12/05/2022 0.0  0.0 - 0.2 /100 WBC Final   • Glucose 12/05/2022 129 (H)  65 - 99 mg/dL Final   • BUN 12/05/2022 15  8 - 23 mg/dL Final   • Creatinine 12/05/2022 1.19  0.76 - 1.27 mg/dL Final   • Sodium 12/05/2022 139  136 - 145 mmol/L Final   • Potassium 12/05/2022 4.3  3.5 - 5.2 mmol/L Final   • Chloride 12/05/2022 103  98 - 107 mmol/L Final   • CO2 12/05/2022 27.0  22.0 - 29.0 mmol/L Final   • Calcium 12/05/2022 9.4  8.6 - 10.5 mg/dL Final   • Total Protein 12/05/2022 6.9  6.0 - 8.5 g/dL Final   • Albumin 12/05/2022 4.50  3.50 - 5.20 g/dL Final   • ALT (SGPT) 12/05/2022 17  1 - 41 U/L Final   • AST (SGOT) 12/05/2022 17  1 - 40 U/L Final   • Alkaline Phosphatase 12/05/2022 70  39 - 117 U/L Final   • Total Bilirubin 12/05/2022 0.4  0.0 - 1.2 mg/dL Final   • Globulin 12/05/2022 2.4  gm/dL Final   • A/G Ratio 12/05/2022 1.9  g/dL Final   • BUN/Creatinine Ratio 12/05/2022 12.6  7.0 - 25.0 Final   • Anion Gap 12/05/2022 9.0  5.0 - 15.0 mmol/L Final   • eGFR 12/05/2022 66.5  >60.0 mL/min/1.73 Final    National Kidney Foundation and American Society of Nephrology (ASN) Task Force recommended calculation based on the Chronic Kidney Disease Epidemiology Collaboration (CKD-EPI) equation refit without  "adjustment for race.   • Hemoglobin A1C 12/05/2022 6.60 (H)  4.80 - 5.60 % Final   • Total Cholesterol 12/05/2022 106  0 - 200 mg/dL Final   • Triglycerides 12/05/2022 149  0 - 150 mg/dL Final   • HDL Cholesterol 12/05/2022 40  40 - 60 mg/dL Final   • LDL Cholesterol  12/05/2022 41  0 - 100 mg/dL Final   • VLDL Cholesterol 12/05/2022 25  5 - 40 mg/dL Final   • LDL/HDL Ratio 12/05/2022 0.91   Final   • 25 Hydroxy, Vitamin D 12/05/2022 39.2  30.0 - 100.0 ng/ml Final   • Vitamin B-12 12/05/2022 500  211 - 946 pg/mL Final   • Microalbumin/Creatinine Ratio 12/05/2022    Final    Unable to calculate   • Creatinine, Urine 12/05/2022 98.0  mg/dL Final   • Microalbumin, Urine 12/05/2022 <1.2  mg/dL Final      Adult Transthoracic Echo Complete w/ Color, Spectral and Contrast if Necessary Per Protocol  •  Left ventricular systolic function is normal. Calculated left   ventricular EF = 60% Left ventricular ejection fraction appears to be 56 -   60%. Normal left ventricular cavity size and wall thickness noted. Apical   hypokinesis Left ventricular diastolic function is consistent with (grade   I) impaired relaxation.  •  Estimated right ventricular systolic pressure from tricuspid   regurgitation is normal (<35 mmHg).  •  Mild TR    [unfilled]  Immunization History   Administered Date(s) Administered   • COVID-19 (MODERNA) 1st,2nd,3rd Dose Monovalent 02/25/2021, 02/25/2021, 03/24/2021, 03/25/2021   • Fluzone High-Dose 65+yrs 12/30/2021   • Pneumococcal Polysaccharide (PPSV23) 03/05/2021   • Tdap 03/05/2021       The following portions of the patient's history were reviewed and updated as appropriate: allergies, current medications, past family history, past medical history, past social history, past surgical history and problem list.        Physical Exam  /64 (BP Location: Right arm, Patient Position: Sitting, Cuff Size: Adult)   Pulse 72   Temp 97.8 °F (36.6 °C)   Ht 172.7 cm (68\")   Wt 85.8 kg (189 lb 3.2 oz)   " "SpO2 96%   BMI 28.77 kg/m²     /64 (BP Location: Right arm, Patient Position: Sitting, Cuff Size: Adult)   Pulse 72   Temp 97.8 °F (36.6 °C)   Ht 172.7 cm (68\")   Wt 85.8 kg (189 lb 3.2 oz)   SpO2 96%   BMI 28.77 kg/m²       Physical Exam  Vitals and nursing note reviewed.   Constitutional:       Appearance: He is well-developed. He is not diaphoretic.   HENT:      Head: Normocephalic and atraumatic.      Right Ear: External ear normal.   Eyes:      Conjunctiva/sclera: Conjunctivae normal.      Pupils: Pupils are equal, round, and reactive to light.   Cardiovascular:      Rate and Rhythm: Normal rate and regular rhythm.      Heart sounds: Normal heart sounds. No murmur heard.  Pulmonary:      Effort: Pulmonary effort is normal. No respiratory distress.      Breath sounds: Normal breath sounds.   Abdominal:      General: Bowel sounds are normal. There is no distension.      Palpations: Abdomen is soft.      Tenderness: There is no abdominal tenderness.   Musculoskeletal:         General: Tenderness present. No deformity. Normal range of motion.      Cervical back: Normal range of motion and neck supple.   Skin:     General: Skin is warm.      Coloration: Skin is not pale.      Findings: No erythema or rash.   Neurological:      Mental Status: He is alert and oriented to person, place, and time.      Cranial Nerves: No cranial nerve deficit.   Psychiatric:         Behavior: Behavior normal.         [unfilled]   Diagnosis Plan   1. Screening for lung cancer  CT Chest Low Dose Follow Up With Contrast      2. Stage 2 chronic kidney disease  CBC Auto Differential    Comprehensive Metabolic Panel    Hemoglobin A1c    Lipid Panel    Microalbumin / Creatinine Urine Ratio - Urine, Clean Catch      3. S/P CABG (coronary artery bypass graft)  CBC Auto Differential    Comprehensive Metabolic Panel    Hemoglobin A1c    Lipid Panel    Microalbumin / Creatinine Urine Ratio - Urine, Clean Catch      4. Vitamin D " deficiency, unspecified   CBC Auto Differential    Comprehensive Metabolic Panel    Hemoglobin A1c    Lipid Panel    Microalbumin / Creatinine Urine Ratio - Urine, Clean Catch      5. Essential hypertension  CBC Auto Differential    Comprehensive Metabolic Panel    Hemoglobin A1c    Lipid Panel    Microalbumin / Creatinine Urine Ratio - Urine, Clean Catch      6. Coronary artery disease, unspecified vessel or lesion type, unspecified whether angina present, unspecified whether native or transplanted heart  CBC Auto Differential    Comprehensive Metabolic Panel    Hemoglobin A1c    Lipid Panel    Microalbumin / Creatinine Urine Ratio - Urine, Clean Catch      7. Controlled type 2 diabetes mellitus with complication, without long-term current use of insulin  CBC Auto Differential    Comprehensive Metabolic Panel    Hemoglobin A1c    Lipid Panel    Microalbumin / Creatinine Urine Ratio - Urine, Clean Catch      8. Lymphocytosis  CBC Auto Differential    Comprehensive Metabolic Panel    Hemoglobin A1c    Lipid Panel    Microalbumin / Creatinine Urine Ratio - Urine, Clean Catch      9. Overweight                     -recommend labowrk   -recommend colonoscopy screening  -next colonoscopy in 2024   -right shoulder pain/Ac joint arthropathy - Orthopedic following   -constipation - was on  miralax 17 mg daily.  High fiber diet   -genital lesions/ulcer/genital herpes - referred  to Dermatology. Refilled valtrex for 500 mg PO BID for 10 days   -CKD stage 2- continue to monitor. Gave information   -CAD sp CABG  X 4  Have records from [facility].  On aspirin crestor toprol XL. Cardiology following had recent stress test   -HTN - on toprol XL 25 mg PO q daily.   -overweight - counseled weight loss >5 minutes BMI at 28.77  -DM type 2 on metformin 1000 mg PO q daily on farxiga 10 mg daily  -history of genital herpes -on valtrex  -HLP - on crestor 10 mg pO qhs start on Vascepa 2 grams PO BID   -advised pt to be safe and call with  questions and concerns  -advised pt to go to ER or call 911 if symptoms worrisome or severe  -advised pt to followup with specialist and referrals  -advised pt to be safe during COVID-19 pandemic  I spent 36  minutes caring for Malcolm on this date of service. This time includes time spent by me in the following activities: preparing for the visit, reviewing tests, obtaining and/or reviewing a separately obtained history, performing a medically appropriate examination and/or evaluation, counseling and educating the patient/family/caregiver, ordering medications, tests, or procedures, referring and communicating with other health care professionals, documenting information in the medical record, independently interpreting results and communicating that information with the patient/family/caregiver and care coordination.   -recheck in 4 months         This document has been electronically signed by Gage Means MD on May 30, 2023 10:41 CDT          Answers for HPI/ROS submitted by the patient on 5/23/2023  What is the primary reason for your visit?: Other  Please describe your symptoms.: none  Have you had these symptoms before?: Yes  How long have you been having these symptoms?: Greater than 2 weeks  Please list any medications you are currently taking for this condition.: no change  Please describe any probable cause for these symptoms. : none

## 2023-05-23 PROBLEM — D72.820 LYMPHOCYTOSIS: Status: ACTIVE | Noted: 2023-05-23

## 2023-05-30 ENCOUNTER — OFFICE VISIT (OUTPATIENT)
Dept: FAMILY MEDICINE CLINIC | Facility: CLINIC | Age: 68
End: 2023-05-30

## 2023-05-30 VITALS
WEIGHT: 189.2 LBS | DIASTOLIC BLOOD PRESSURE: 64 MMHG | SYSTOLIC BLOOD PRESSURE: 110 MMHG | TEMPERATURE: 97.8 F | BODY MASS INDEX: 28.67 KG/M2 | HEIGHT: 68 IN | OXYGEN SATURATION: 96 % | HEART RATE: 72 BPM

## 2023-05-30 DIAGNOSIS — E66.3 OVERWEIGHT: ICD-10-CM

## 2023-05-30 DIAGNOSIS — Z95.1 S/P CABG (CORONARY ARTERY BYPASS GRAFT): ICD-10-CM

## 2023-05-30 DIAGNOSIS — Z12.2 SCREENING FOR LUNG CANCER: Primary | ICD-10-CM

## 2023-05-30 DIAGNOSIS — E55.9 VITAMIN D DEFICIENCY, UNSPECIFIED: ICD-10-CM

## 2023-05-30 DIAGNOSIS — I10 ESSENTIAL HYPERTENSION: ICD-10-CM

## 2023-05-30 DIAGNOSIS — I25.10 CORONARY ARTERY DISEASE, UNSPECIFIED VESSEL OR LESION TYPE, UNSPECIFIED WHETHER ANGINA PRESENT, UNSPECIFIED WHETHER NATIVE OR TRANSPLANTED HEART: ICD-10-CM

## 2023-05-30 DIAGNOSIS — N18.2 STAGE 2 CHRONIC KIDNEY DISEASE: ICD-10-CM

## 2023-05-30 DIAGNOSIS — D72.820 LYMPHOCYTOSIS: ICD-10-CM

## 2023-05-30 DIAGNOSIS — E11.8 CONTROLLED TYPE 2 DIABETES MELLITUS WITH COMPLICATION, WITHOUT LONG-TERM CURRENT USE OF INSULIN: ICD-10-CM

## 2023-05-30 NOTE — PATIENT INSTRUCTIONS
Get labwork before next visit  fasting    Will order CT of chest lung cancer screening at imaging center in September 2023 .     Recheck in 3 months

## 2023-06-08 ENCOUNTER — OFFICE VISIT (OUTPATIENT)
Dept: CARDIOLOGY | Facility: CLINIC | Age: 68
End: 2023-06-08
Payer: MEDICARE

## 2023-06-08 VITALS
OXYGEN SATURATION: 96 % | DIASTOLIC BLOOD PRESSURE: 74 MMHG | HEART RATE: 74 BPM | WEIGHT: 190.8 LBS | HEIGHT: 68 IN | BODY MASS INDEX: 28.92 KG/M2 | TEMPERATURE: 97.7 F | SYSTOLIC BLOOD PRESSURE: 126 MMHG

## 2023-06-08 DIAGNOSIS — I10 ESSENTIAL HYPERTENSION: ICD-10-CM

## 2023-06-08 DIAGNOSIS — E11.8 CONTROLLED TYPE 2 DIABETES MELLITUS WITH COMPLICATION, WITHOUT LONG-TERM CURRENT USE OF INSULIN: ICD-10-CM

## 2023-06-08 DIAGNOSIS — Z95.1 S/P CABG (CORONARY ARTERY BYPASS GRAFT): Primary | ICD-10-CM

## 2023-06-08 DIAGNOSIS — E78.2 MIXED HYPERLIPIDEMIA: ICD-10-CM

## 2023-06-08 NOTE — PROGRESS NOTES
Malcolm Cheng  68 y.o. male    1. S/P CABG (coronary artery bypass graft)    2. Essential hypertension    3. Mixed hyperlipidemia    4. Controlled type 2 diabetes mellitus with complication, without long-term current use of insulin        History of Present Illness:  Malcolm Cheng is a 68-year-old male with coronary artery disease status post CABG in 2018, hypertension, diabetes mellitus.    His CABG was at [facility] following an abnormal stress test which led to cardiac catheterization which showed multivessel coronary artery disease.  He underwent LIMA to LAD, SVG to diagonal, SVG to OM and SVG to PDA and TMR in January 2018.     The patient has progressed well since the procedure and denied any chest pain, shortness of breath or palpitation at this time.  He has been compliant with his medications.  He is able to perform his activities of daily living.    Exercise Cardiolite stress test in December 2022 showed:  Patient exercised for 4 minutes and 31 seconds of the Michael protocol to achieve a workload of 5.7 METS.  Test was stopped due to dyspnea/target heart rate achieved.  He achieved 101% of maximal age-predicted heart rate.  There was accelerated blood pressure response.  No chest pain was reported.   Baseline EKG showed sinus rhythm with poor R wave progression V1 to V3.  As exercise progressed, there were baseline artifacts which made it difficult to interpret the EKGs but no definite ST depression suggestive of ischemia was noted at peak exercise or recovery.  Findings consistent with an equivocal stress test    Left ventricular ejection fraction is normal (Calculated EF = 67%).    Myocardial perfusion imaging indicates a small-sized infarct located in the apex with no significant ischemia noted.    Impressions are consistent with a low risk study.    Echocardiogram in February 2023 showed:    Left ventricular systolic function is normal. Calculated left ventricular EF = 60% Left  ventricular ejection fraction appears to be 56 - 60%. Normal left ventricular cavity size and wall thickness noted. Apical hypokinesis Left ventricular diastolic function is consistent with (grade I) impaired relaxation.    Estimated right ventricular systolic pressure from tricuspid regurgitation is normal (<35 mmHg).    Mild TR    No Known Allergies      Past Medical History:   Diagnosis Date    Diabetes mellitus     Hyperlipidemia          Past Surgical History:   Procedure Laterality Date    APPENDECTOMY      CARDIAC SURGERY      CHOLECYSTECTOMY      COLONOSCOPY N/A 5/3/2021    Procedure: COLONOSCOPY a month out;  Surgeon: Hector Dennison MD;  Location: [facility] ENDOSCOPY;  Service: Gastroenterology;  Laterality: N/A;         Family History   Problem Relation Age of Onset    Heart disease Father     Alcohol abuse Father     Cancer Brother          Social History     Socioeconomic History    Marital status:    Tobacco Use    Smoking status: Former     Types: Cigarettes     Quit date:      Years since quittin.4    Smokeless tobacco: Never   Vaping Use    Vaping Use: Never used   Substance and Sexual Activity    Alcohol use: Not Currently    Drug use: Never    Sexual activity: Defer         Current Outpatient Medications   Medication Sig Dispense Refill    aspirin 81 MG EC tablet Take 1 tablet by mouth Daily.      dapagliflozin Propanediol (Farxiga) 10 MG tablet Take 10 mg by mouth Daily. 90 tablet 3    metFORMIN (GLUCOPHAGE) 1000 MG tablet TAKE ONE TABLET BY MOUTH TWICE A DAY WITH MEALS 180 tablet 0    metoprolol succinate XL (TOPROL-XL) 25 MG 24 hr tablet Take 1 tablet by mouth Daily. 90 tablet 1    Omega-3 Fatty Acids (fish oil) 1000 MG capsule capsule Take 1 capsule by mouth.      rosuvastatin (CRESTOR) 10 MG tablet Take 1 tablet by mouth Every Night. 90 tablet 1    valACYclovir (VALTREX) 500 MG tablet Take 1 tablet by mouth Daily. (Patient taking differently: Take 1 tablet by mouth As Needed.)  "90 tablet 1    vitamin D (ERGOCALCIFEROL) 1.25 MG (58359 UT) capsule capsule Take 1 capsule by mouth Every 7 (Seven) Days. 4 capsule 3     No current facility-administered medications for this visit.         OBJECTIVE    /74 (BP Location: Left arm, Patient Position: Sitting, Cuff Size: Adult)   Pulse 74   Temp 97.7 °F (36.5 °C)   Ht 172.7 cm (68\")   Wt 86.5 kg (190 lb 12.8 oz)   SpO2 96%   BMI 29.01 kg/m²         Review of Systems: The following systems reviewed and no changes noted.    Constitutional:  Denies recent weight loss, weight gain, fever or chills, no change in exercise tolerance     HENT:  Denies any hearing loss, epistaxis, hoarseness, or difficulty speaking.     Eyes: Wears eyeglasses or contact lenses     Respiratory:  Denies dyspnea with exertion, no cough, wheezing, or hemoptysis.     Cardiovascular: Negative for palpitations, chest pain, orthopnea, PND, peripheral edema, syncope, or claudication.     Gastrointestinal:  Denies change in bowel habits, dyspepsia, ulcer disease, hematochezia, or melena.     Endocrine: Negative for cold intolerance, heat intolerance, polydipsia, polyphagia and polyuria.     Genitourinary: History of genital herpes      Musculoskeletal: DJD    Skin:  Denies any change in hair or nails, rashes, or skin lesions.     Allergic/Immunologic: Negative.  Negative for environmental allergies, food allergies and/or immunocompromised state.     Neurological:  Denies any history of recurrent headaches, strokes, TIA, or seizure disorder.     Hematological: Denies any food allergies, seasonal allergies, bleeding disorders, or lymphadenopathy.     Psychiatric/Behavioral: Denies any history of depression, substance abuse, or change in cognitive function.       Physical Exam: The following systems reviewed and no changes noted.    Constitutional: Cooperative, alert and oriented, well-developed, well-nourished, in no acute distress.     HENT:   Head: Normocephalic, normal hair " patterns, no masses or tenderness.  Ears, Nose, and Throat: No gross abnormalities. No pallor or cyanosis. Dentition good.   Eyes: EOMS intact, PERRL, conjunctivae and lids unremarkable. Fundoscopic exam and visual fields not performed.   Neck: No palpable masses or adenopathy, no thyromegaly, no JVD, carotid pulses are full and equal bilaterally and without bruit.     Cardiovascular: Regular rhythm, S1 and S2 normal, no S3 or S4.  No murmurs, gallops, or rubs detected.     Pulmonary/Chest: Chest: normal symmetry, normal respiratory excursion, no intercostal retraction, no use of accessory muscles.     Pulmonary: Normal breath sounds. No rales or rhonchi.    Abdominal: Abdomen soft, bowel sounds normoactive, no masses, no hepatosplenomegaly, nontender, no bruit.     Musculoskeletal: No deformities, clubbing, cyanosis, erythema, or edema observed.    Neurological: No gross motor or sensory deficits noted, affect appropriate, oriented to time, person, place.     Skin: Warm and dry to the touch, no apparent skin lesion or mass noted.     Psychiatric: He has a normal mood and affect. His behavior is normal. Judgment and thought content normal.         Procedures      Lab Results   Component Value Date    WBC 9.30 03/24/2023    HGB 16.6 03/24/2023    HCT 49.3 03/24/2023    MCV 91.1 03/24/2023     03/24/2023     Lab Results   Component Value Date    GLUCOSE 127 (H) 03/24/2023    BUN 23 03/24/2023    CREATININE 0.93 03/24/2023    EGFRIFNONA 82 10/13/2021    BCR 24.7 03/24/2023    CO2 27.0 03/24/2023    CALCIUM 9.5 03/24/2023    ALBUMIN 4.8 03/24/2023    AST 18 03/24/2023    ALT 21 03/24/2023     Lab Results   Component Value Date    CHOL 116 03/24/2023    CHOL 106 12/05/2022    CHOL 121 08/31/2022     Lab Results   Component Value Date    TRIG 145 03/24/2023    TRIG 149 12/05/2022    TRIG 142 08/31/2022     Lab Results   Component Value Date    HDL 47 03/24/2023    HDL 40 12/05/2022    HDL 44 08/31/2022     No  components found for: LDLCALC  Lab Results   Component Value Date    LDL 44 03/24/2023    LDL 41 12/05/2022    LDL 52 08/31/2022     No results found for: HDLLDLRATIO  No components found for: CHOLHDL  Lab Results   Component Value Date    HGBA1C 6.90 (H) 03/24/2023     Lab Results   Component Value Date    TSH 1.580 08/31/2022           ASSESSMENT AND PLAN  Malcolm Cheng is a 68-year-old male with known coronary artery disease status post CABG in January 2018. His other medical issues include hypertension, diabetes mellitus, hyperlipidemia.  He is asymptomatic at the current time with no evidence of angina, arrhythmia or congestive heart failure.      His lab results from March 2023 were reviewed and LDL was 44 and HDL 47.  Hemoglobin A1c was 6.9.  CBC and CMP were within acceptable normal limits.    I have continued antiplatelet therapy with aspirin, low-dose metoprolol succinate 25 mg daily and lipid-lowering therapy with Crestor.    Diagnoses and all orders for this visit:    1. S/P CABG (coronary artery bypass graft) (Primary)    2. Essential hypertension    3. Mixed hyperlipidemia    4. Controlled type 2 diabetes mellitus with complication, without long-term current use of insulin        BMI is >= 25 and <30. (Overweight) The following options were offered after discussion;: exercise counseling/recommendations      Malcolm Cheng  reports that he quit smoking about 16 years ago. His smoking use included cigarettes. He has never used smokeless tobacco..              Eric Summers MD  6/8/2023  14:10 CDT

## 2023-07-24 ENCOUNTER — TELEPHONE (OUTPATIENT)
Dept: FAMILY MEDICINE CLINIC | Facility: CLINIC | Age: 68
End: 2023-07-24
Payer: MEDICARE

## 2023-07-24 NOTE — TELEPHONE ENCOUNTER
Called patient to inform that provider will be out of the office on 10/3/23. No answer left voicemail. Informed patient to call the office to reschedule or he can reschedule on mychart.

## 2023-09-01 ENCOUNTER — PRIOR AUTHORIZATION (OUTPATIENT)
Dept: FAMILY MEDICINE CLINIC | Facility: CLINIC | Age: 68
End: 2023-09-01
Payer: MEDICARE

## 2023-09-12 ENCOUNTER — OFFICE VISIT (OUTPATIENT)
Dept: FAMILY MEDICINE CLINIC | Facility: CLINIC | Age: 68
End: 2023-09-12
Payer: MEDICARE

## 2023-09-12 VITALS
HEIGHT: 68 IN | SYSTOLIC BLOOD PRESSURE: 110 MMHG | DIASTOLIC BLOOD PRESSURE: 70 MMHG | HEART RATE: 64 BPM | BODY MASS INDEX: 28.4 KG/M2 | OXYGEN SATURATION: 96 % | TEMPERATURE: 97.3 F | WEIGHT: 187.4 LBS

## 2023-09-12 DIAGNOSIS — Z00.00 MEDICARE ANNUAL WELLNESS VISIT, SUBSEQUENT: Primary | ICD-10-CM

## 2023-09-12 PROCEDURE — 3074F SYST BP LT 130 MM HG: CPT | Performed by: FAMILY MEDICINE

## 2023-09-12 PROCEDURE — 1170F FXNL STATUS ASSESSED: CPT | Performed by: FAMILY MEDICINE

## 2023-09-12 PROCEDURE — 3044F HG A1C LEVEL LT 7.0%: CPT | Performed by: FAMILY MEDICINE

## 2023-09-12 PROCEDURE — 3078F DIAST BP <80 MM HG: CPT | Performed by: FAMILY MEDICINE

## 2023-09-12 PROCEDURE — G0439 PPPS, SUBSEQ VISIT: HCPCS | Performed by: FAMILY MEDICINE

## 2023-09-12 NOTE — PATIENT INSTRUCTIONS
Medicare Wellness  Personal Prevention Plan of Service     Date of Office Visit:    Encounter Provider:  Gage Means MD  Place of Service:  [facility] [place]  Patient Name: Malcolm Cheng  :  1955    As part of the Medicare Wellness portion of your visit today, we are providing you with this personalized preventive plan of services (PPPS). This plan is based upon recommendations of the [place] Preventive Services Task Force (USPSTF) and the Advisory Committee on Immunization Practices (ACIP).    This lists the preventive care services that should be considered, and provides dates of when you are due. Items listed as completed are up-to-date and do not require any further intervention.    Health Maintenance   Topic Date Due    Pneumococcal Vaccine 65+ (2 - PCV) 2026 (Originally 3/5/2022)    HEMOGLOBIN A1C  2023    INFLUENZA VACCINE  10/01/2023    URINE MICROALBUMIN  2023    LIPID PANEL  2024    COLORECTAL CANCER SCREENING  2024    DIABETIC EYE EXAM  2024    BMI FOLLOWUP  2024    DIABETIC FOOT EXAM  2024    ANNUAL WELLNESS VISIT  2024    TDAP/TD VACCINES (2 - Td or Tdap) 2031    HEPATITIS C SCREENING  Completed    AAA SCREEN (ONE-TIME)  Completed    COVID-19 Vaccine  Discontinued    ZOSTER VACCINE  Discontinued       No orders of the defined types were placed in this encounter.      Return in about 1 year (around 2024) for Medicare Wellness.

## 2023-09-12 NOTE — PROGRESS NOTES
The ABCs of the Annual Wellness Visit  Subsequent Medicare Wellness Visit    Subjective    Malcolm Cheng is a 68 y.o. male who presents for a Subsequent Medicare Wellness Visit.    The following portions of the patient's history were reviewed and   updated as appropriate: allergies, current medications, past family history, past medical history, past social history, past surgical history, and problem list.    Compared to one year ago, the patient feels his physical   health is the same.    Compared to one year ago, the patient feels his mental   health is the same.    Recent Hospitalizations:  He was not admitted to the hospital during the last year.       Current Medical Providers:  Patient Care Team:  Gage Means MD as PCP - General (Family Medicine)    Outpatient Medications Prior to Visit   Medication Sig Dispense Refill    aspirin 81 MG EC tablet Take 1 tablet by mouth Daily.      dapagliflozin Propanediol (Farxiga) 10 MG tablet Take 10 mg by mouth Daily. 90 tablet 3    metFORMIN (GLUCOPHAGE) 1000 MG tablet TAKE ONE TABLET BY MOUTH TWICE A DAY WITH MEALS 180 tablet 0    metoprolol succinate XL (TOPROL-XL) 25 MG 24 hr tablet Take 1 tablet by mouth Daily. 90 tablet 1    Omega-3 Fatty Acids (fish oil) 1000 MG capsule capsule Take 1 capsule by mouth.      rosuvastatin (CRESTOR) 10 MG tablet Take 1 tablet by mouth Every Night. 90 tablet 1    valACYclovir (VALTREX) 500 MG tablet Take 1 tablet by mouth Daily. (Patient taking differently: Take 1 tablet by mouth As Needed.) 90 tablet 1    vitamin D (ERGOCALCIFEROL) 1.25 MG (36268 UT) capsule capsule Take 1 capsule by mouth Every 7 (Seven) Days. 4 capsule 3     No facility-administered medications prior to visit.       No opioid medication identified on active medication list. I have reviewed chart for other potential  high risk medication/s and harmful drug interactions in the elderly.        Aspirin is on active medication list. Aspirin use is indicated  "based on review of current medical condition/s. Pros and cons of this therapy have been discussed today. Benefits of this medication outweigh potential harm.  Patient has been encouraged to continue taking this medication.  .      Patient Active Problem List   Diagnosis    Coronary artery disease    Vitamin D deficiency, unspecified     Controlled type 2 diabetes mellitus with complication, without long-term current use of insulin    Mixed hyperlipidemia    Overweight    Yeast infection    Herpes infection    Right shoulder pain    Heart disease    Encounter for colonoscopy due to history of colonic polyp    Recurrent genital herpes simplex    S/P CABG (coronary artery bypass graft)    Essential hypertension    Stage 2 chronic kidney disease    Lymphocytosis     Advance Care Planning   Advance Care Planning     Advance Directive is not on file.  ACP discussion was held with the patient during this visit. Patient does not have an advance directive, information provided.     Objective    There were no vitals filed for this visit.  Estimated body mass index is 28.89 kg/m² as calculated from the following:    Height as of 23: 172.7 cm (68\").    Weight as of 23: 86.2 kg (190 lb).           Does the patient have evidence of cognitive impairment? No          HEALTH RISK ASSESSMENT    Smoking Status:  Social History     Tobacco Use   Smoking Status Former    Types: Cigarettes    Quit date:     Years since quittin.7   Smokeless Tobacco Never     Alcohol Consumption:  Social History     Substance and Sexual Activity   Alcohol Use Not Currently     Fall Risk Screen:    ANDREI Fall Risk Assessment was completed, and patient is at LOW risk for falls.Assessment completed on:2023    Depression Screenin/28/2023    10:00 AM   PHQ-2/PHQ-9 Depression Screening   Little Interest or Pleasure in Doing Things 0-->not at all   Feeling Down, Depressed or Hopeless 0-->not at all   PHQ-9: Brief Depression " Severity Measure Score 0       Health Habits and Functional and Cognitive Screenin/9/2022    11:30 AM   Functional & Cognitive Status   Do you have difficulty preparing food and eating? No   Do you have difficulty bathing yourself, getting dressed or grooming yourself? No   Do you have difficulty using the toilet? No   Do you have difficulty moving around from place to place? No   Do you have trouble with steps or getting out of a bed or a chair? No   Current Diet Well Balanced Diet   Dental Exam Up to date   Eye Exam Up to date   Exercise (times per week) 7 times per week   Current Exercises Include Walking   Do you need help using the phone?  No   Are you deaf or do you have serious difficulty hearing?  No   Do you need help to go to places out of walking distance? No   Do you need help shopping? No   Do you need help preparing meals?  No   Do you need help with housework?  No   Do you need help with laundry? No   Do you need help taking your medications? No   Do you need help managing money? No   Do you ever drive or ride in a car without wearing a seat belt? No   Have you felt unusual stress, anger or loneliness in the last month? No   Who do you live with? Spouse   If you need help, do you have trouble finding someone available to you? No   Have you been bothered in the last four weeks by sexual problems? No   Do you have difficulty concentrating, remembering or making decisions? No       Age-appropriate Screening Schedule:  Refer to the list below for future screening recommendations based on patient's age, sex and/or medical conditions. Orders for these recommended tests are listed in the plan section. The patient has been provided with a written plan.    Health Maintenance   Topic Date Due    ANNUAL WELLNESS VISIT  2023    Pneumococcal Vaccine 65+ (2 - PCV) 2026 (Originally 3/5/2022)    HEMOGLOBIN A1C  2023    INFLUENZA VACCINE  10/01/2023    URINE MICROALBUMIN  2023    LIPID  PANEL  03/24/2024    COLORECTAL CANCER SCREENING  05/03/2024    DIABETIC EYE EXAM  05/19/2024    BMI FOLLOWUP  06/08/2024    DIABETIC FOOT EXAM  07/13/2024    TDAP/TD VACCINES (2 - Td or Tdap) 03/05/2031    HEPATITIS C SCREENING  Completed    AAA SCREEN (ONE-TIME)  Completed    COVID-19 Vaccine  Discontinued    ZOSTER VACCINE  Discontinued                  CMS Preventative Services Quick Reference  Risk Factors Identified During Encounter  Alcohol Misuse: Patient encouraged to limit alcohol use to no more than 1 standard alcoholic beverage per day. (12 ounce beer, 6 ounce wine, one shot liquor)  Chronic Pain: Natural history and expected course discussed. Questions answered.  Depression/Dysphoria: Prescribed new antidepressant medication treatment. Follow up visit planned.  Drug Use/Abuse Identified or Suspected: Behavioral Health Specialty Referral Ordered  Fall Risk-High or Moderate: Discussed Fall Prevention in the home  Glaucoma or Family History of Glaucoma:  Ophthalmology Appointment Recommended  Hearing Problem: Referral to ENT ordered  Inadequate Social Support, Isolation, Loneliness, Lack of Transportation, Financial Difficulties, or Caregiver Stress:   Inactivity/Sedentary: Patient was advised to exercise at least 150 minutes a week per CDC recommendations.  Polypharmacy: Medication List reviewed  High Risk Sexual Behavior Identified:   Tobacco Use/Dependance Risk (use dotphrase .tobaccocessation for documentation)  Urinary Incontinence: Kegel exercises discussed  Dental Screening Recommended  Vision Screening Recommended  The above risks/problems have been discussed with the patient.  Pertinent information has been shared with the patient in the After Visit Summary.  An After Visit Summary and PPPS were made available to the patient.    Follow Up:   Next Medicare Wellness visit to be scheduled in 1 year.       Additional E&M Note during same encounter follows:  Patient has multiple medical problems which  are significant and separately identifiable that require additional work above and beyond the Medicare Wellness Visit.      Chief Complaint  No chief complaint on file.    Subjective        HPI  Malcolm Cheng is also being seen today for Subsequent Medicare Wellness          Objective   Vital Signs:  There were no vitals taken for this visit.    Physical Exam                    Assessment and Plan   There are no diagnoses linked to this encounter.  -Action plan discussed please see scanned documents  -repeat in 1 year        This document has been electronically signed by Gage Means MD on September 12, 2023 10:31 CDT           Follow Up   No follow-ups on file.  Patient was given instructions and counseling regarding his condition or for health maintenance advice. Please see specific information pulled into the AVS if appropriate.

## 2023-09-25 NOTE — TELEPHONE ENCOUNTER
Antihyperglycemics Protocol [id number]/2023 05:05 PM   Protocol Details HgA1c in past 6 months

## (undated) DEVICE — TRAP SXN POLYP QUICKCATCH LF

## (undated) DEVICE — Device: Brand: DISPOSABLE ELECTROSURGICAL SNARE